# Patient Record
Sex: MALE | Race: BLACK OR AFRICAN AMERICAN | NOT HISPANIC OR LATINO | Employment: STUDENT | URBAN - METROPOLITAN AREA
[De-identification: names, ages, dates, MRNs, and addresses within clinical notes are randomized per-mention and may not be internally consistent; named-entity substitution may affect disease eponyms.]

---
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
## 2021-05-27 ASSESSMENT — PATIENT HEALTH QUESTIONNAIRE - PHQ9: SUM OF ALL RESPONSES TO PHQ QUESTIONS 1-9: 0

## 2021-05-28 ASSESSMENT — ASTHMA QUESTIONNAIRES: ACT_TOTALSCORE: 20

## 2021-05-29 NOTE — TELEPHONE ENCOUNTER
The patient has asthma and has not been seen since 2017.  Refilled his inhaler.  Please schedule him for a physical exam plus asthma check.    No refills until he is seen.    No future appointments.     You can let him know he is due for shots when he comes in, too:  Health Maintenance Due   Topic Date Due     HPV VACCINES (2 - Male 3-dose series) 07/03/2017     HEPATITIS A VACCINES (2 of 2 - 2-dose series) 12/05/2017     ASTHMA ACTION PLAN  04/11/2018     ASTHMA CONTROL TEST  04/11/2018     ASTHMA FOLLOW-UP  04/11/2018     ADVANCE DIRECTIVES DISCUSSED WITH PATIENT  05/08/2019

## 2021-05-29 NOTE — PROGRESS NOTES
"Subjective: This patient comes in for follow-up.  Patient has a history of asthma.  Moderate persistent without complication.  ACT score today was 22.    Patient takes Advair 250/50 1 inhalation twice a day and as needed albuterol.  Also has been on Singulair.    Patient is also had some environmental allergy symptoms.  I can use some fluticasone for the rhinitis.    He otherwise denies new problems or issues.    Patient denies any fever chills denies any bowel or bladder issues.    He gets itchy eyes and runny nose more in the spring and fall but does get year-round at times.    We discussed the use of the fluticasone.    Also discussed rinsing mouth out after he uses his Advair for his asthma.    We did discuss immunizations and he will go ahead and get the HPV and hepatitis A today as well.    Tobacco status: He  reports that he has never smoked. He has never used smokeless tobacco.    Patient Active Problem List    Diagnosis Date Noted     Asthma With Acute Exacerbation      Asthma        Current Outpatient Medications   Medication Sig Dispense Refill     albuterol (PROAIR HFA;PROVENTIL HFA;VENTOLIN HFA) 90 mcg/actuation inhaler Inhale 2 puffs every 6 (six) hours as needed for wheezing or shortness of breath. 1 Inhaler 3     fluticasone propion-salmeterol (ADVAIR DISKUS) 250-50 mcg/dose DISKUS Inhale 1 puff 2 (two) times a day. 60 puff 11     montelukast (SINGULAIR) 10 mg tablet TAKE 1 TABLET DAILY 30 tablet 11     fluticasone propionate (FLONASE) 50 mcg/actuation nasal spray 2 sprays into each nostril daily. 10 g 11     No current facility-administered medications for this visit.        ROS: 10 point review of systems positive as outlined above otherwise negative    Objective:    /68 (Patient Site: Left Arm, Patient Position: Sitting, Cuff Size: Adult Regular)   Pulse 76   Resp 16   Ht 6' 3\" (1.905 m)   Wt 187 lb (84.8 kg)   BMI 23.37 kg/m    Body mass index is 23.37 kg/m .      General appearance no " acute distress    HEENT neck without JVD oropharynx is clear nose with some clear drainage.    Lungs were clear no rales or rhonchi skin was normal no rashes.    No peripheral edema.    Heart was regular rate and 70s.        Assessment:  1. Moderate persistent asthma without complication     2. Asthma  fluticasone propion-salmeterol (ADVAIR DISKUS) 250-50 mcg/dose DISKUS    albuterol (PROAIR HFA;PROVENTIL HFA;VENTOLIN HFA) 90 mcg/actuation inhaler    montelukast (SINGULAIR) 10 mg tablet   3. Immunization counseling  HPV vaccine 9 valent 3 dose IM    Hepatitis A vaccine Ped/Adol 2 dose IM (18yr & under)   4. Environmental and seasonal allergies  fluticasone propionate (FLONASE) 50 mcg/actuation nasal spray     Asthma stable we will treat with Advair and Pro Air and Singulair as before    Environmental and seasonal allergies, will also add fluticasone nasal spray.    Plan: Follow-up in 1 year or earlier as needed.  Immunization update with HPV and hepatitis A today as well  This transcription uses voice recognition software, which may contain typographical errors.

## 2021-05-29 NOTE — TELEPHONE ENCOUNTER
Patient Returning Call  Reason for call:  Dad returning call to the clinic stating I missed a call regarding my son.  Information relayed to patient:  Yes and dad agrees with plan to schedule an appointment for son.   Patient has additional questions:  No  If YES, what are your questions/concerns:  none  Okay to leave a detailed message?: Yes

## 2021-05-29 NOTE — TELEPHONE ENCOUNTER
"Called and left voicemail for pt to call clinic back. #1  \"Okay to relay message and schedule physical\"  "

## 2021-05-29 NOTE — TELEPHONE ENCOUNTER
Relayed message to patients braxton Baez ([facility]). He stated they did  Rx at [facility]. Completing task.

## 2021-05-29 NOTE — TELEPHONE ENCOUNTER
Medication Question or Clarification  Who is calling: Pharmacy: [facility], [place], [place]  What medication are you calling about? (include dose and sig)   fluticasone propion-salmeterol (ADVAIR DISKUS) 250-50 mcg/dose DISKUS 60 puff 11 6/6/2019     Sig - Route: Inhale 1 puff 2 (two) times a day. - Inhalation    Sent to pharmacy as: fluticasone propion-salmeterol (ADVAIR DISKUS) 250-50 mcg/dose DISKUS        Who prescribed the medication?: Gerson Foley MD  What is your question/concern?:  Provider to fax New Prescription of above medication to [facility] [place], [place].  Fax#  Pharmacy: [facility], [place], [place]  Okay to leave a detailed message?: Yes  Site CMT - Please call the pharmacy to obtain any additional needed information.

## 2021-05-29 NOTE — TELEPHONE ENCOUNTER
Patient was in last Thursday and I sent his Advair to [facility] pharmacy.  Please check with the patient as he wanted sent to [facility] as well

## 2021-05-29 NOTE — TELEPHONE ENCOUNTER
RN cannot approve Refill Request    RN can NOT refill this medication overdue for office visits and/or labs.    Remi Hickman, [facility] Triage/Med Refill 5/30/2019    Requested Prescriptions   Pending Prescriptions Disp Refills     fluticasone propion-salmeterol (ADVAIR) 250-50 mcg/dose DISKUS 3 each 3     Sig: Inhale 1 puff 2 (two) times a day.       Asthma Medications Refill Protocol Failed - 5/30/2019  1:15 PM        Failed - PCP or prescribing provider visit in last 6 months     Last office visit with prescriber/PCP: Visit date not found OR same dept: Visit date not found OR same specialty: 6/5/2017 Gerson Foley MD Last physical: Visit date not found Last MTM visit: Visit date not found     Next appt within 3 mo: Visit date not found  Next physical within 3 mo: Visit date not found  Prescriber OR PCP: Gerson Foley MD  Last diagnosis associated with med order: 1. Asthma  - fluticasone propion-salmeterol (ADVAIR) 250-50 mcg/dose DISKUS; Inhale 1 puff 2 (two) times a day.  Dispense: 3 each; Refill: 3    If protocol passes may refill for 6 months if within 3 months of last provider visit (or a total of 9 months).              Failed - PCP or prescribing provider visit in last year     Last office visit with prescriber/PCP: 6/5/2017 Gerson Foley MD OR same dept: Visit date not found OR same specialty: 6/5/2017 Gerson Foley MD  Last physical: Visit date not found Last MTM visit: Visit date not found    Next appt within 3 mo: Visit date not found Next physical within 3 mo: Visit date not found  Prescriber OR PCP: Gerson Foley MD  Last diagnosis associated with med order: 1. Asthma  - fluticasone propion-salmeterol (ADVAIR) 250-50 mcg/dose DISKUS; Inhale 1 puff 2 (two) times a day.  Dispense: 3 each; Refill: 3    If protocol passes may refill for 6 months if within 3 months of last provider visit (or a total of 9 months).

## 2021-05-29 NOTE — TELEPHONE ENCOUNTER
Fax received from [facility], they have started the Prior Authorization Process via Cover My Meds    CoverMyMeds Key: [id number]    Medication Name:   fluticasone propion-salmeterol (ADVAIR) 250-50 mcg/dose DISKUS 3 each 0 5/30/2019     Sig - Route: Inhale 1 puff 2 (two) times a day. - Inhalation    Sent to pharmacy as: fluticasone propion-salmeterol (ADVAIR) 250-50 mcg/dose DISKUS    E-Prescribing Status: Receipt confirmed by pharmacy (5/30/2019  3:53 PM CDT)          Insurance Plan: [employer]  PBM: [facility]  Patient ID: [id number]    Please complete the PA process

## 2021-05-31 VITALS — HEIGHT: 74 IN | BODY MASS INDEX: 23.36 KG/M2 | WEIGHT: 182 LBS

## 2021-06-03 VITALS — HEIGHT: 75 IN | BODY MASS INDEX: 23.25 KG/M2 | WEIGHT: 187 LBS

## 2021-06-10 NOTE — TELEPHONE ENCOUNTER
FYI - Status Update  Who is Calling: Patient's Father  Update: Questioning if refill can be expedited due to being out of medication. Patient's father was transferred to scheduling to schedule an appointment.  Okay to leave a detailed message?:  No return call needed

## 2021-06-10 NOTE — PROGRESS NOTES
"Jonathan Rees is a 20 y.o. male who is being evaluated via a billable video visit.      The patient has been notified of following:     \"This video visit will be conducted via a call between you and your physician/provider. We have found that certain health care needs can be provided without the need for an in-person physical exam.  This service lets us provide the care you need with a video conversation.  If a prescription is necessary we can send it directly to your pharmacy.  If lab work is needed we can place an order for that and you can then stop by our lab to have the test done at a later time.    Video visits are billed at different rates depending on your insurance coverage. Please reach out to your insurance provider with any questions.    If during the course of the call the physician/provider feels a video visit is not appropriate, you will not be charged for this service.\"    Patient has given verbal consent to a Video visit? Yes  How would you like to obtain your AVS? AVS Preference: Mail a copy.  If dropped by the video visit, the video invitation should be sent to: Text to cell phone: [phone]   Will anyone else be joining your video visit? No        Video Start Time: 10:40 am    Additional provider notes:     Subjective: This patient had a virtual visit, video, due to the coronavirus pandemic.    This patient has a history of moderate persistent asthma he takes Advair and as needed albuterol    He is done quite well has not had much need for the albuterol maybe once or twice a week.    In the fall things get a little worse with allergies.  In the past he been on Singulair and fluticasone he has not filled those for a while but would like that.    He is going to college.  He will be staying in apartment with other students.  He needs meningitis B shot.  Also his third HPV shot    We discussed that    Meds were refilled including the Advair albuterol Singulair and fluticasone nasal spray.    ACT " score was 20 today    Denies any COVID-19 symptoms sees not sure if he had any exposures he is actually been checked a couple times.    No additional concerns or issues    We discussed getting a flu shot this fall and he plans to do that    Tobacco status: He  reports that he has never smoked. He has never used smokeless tobacco.    Patient Active Problem List    Diagnosis Date Noted     Asthma With Acute Exacerbation      Asthma        Current Outpatient Medications   Medication Sig Dispense Refill     albuterol (PROAIR HFA;PROVENTIL HFA;VENTOLIN HFA) 90 mcg/actuation inhaler Inhale 2 puffs every 6 (six) hours as needed for wheezing or shortness of breath. 1 Inhaler 3     fluticasone propion-salmeteroL (ADVAIR DISKUS) 250-50 mcg/dose DISKUS Inhale 1 puff 2 (two) times a day. 60 puff 11     fluticasone propionate (FLONASE) 50 mcg/actuation nasal spray 2 sprays into each nostril daily. 10 g 11     montelukast (SINGULAIR) 10 mg tablet TAKE 1 TABLET DAILY 30 tablet 11     No current facility-administered medications for this visit.        ROS:   10 point review of systems positive as discussed above otherwise negative    Objective:    There were no vitals taken for this visit.  There is no height or weight on file to calculate BMI.      General appearance no acute distress    HEENT denies any sore throat he has some mild congestion in the nose    Lungs: Nonlabored breathing presently no wheezing    Heart: No palpitations or rapid heartbeat    Denies any skin changes or problems he does have some mild acne    Lower extremities denies any edema or pain.  No joint redness warmth or swelling.    Results for orders placed or performed in visit on 04/11/17   Rapid Strep A Screen-Throat    Specimen: Throat   Result Value Ref Range    Rapid Strep A Antigen No Group A Strep detected No Group A Strep detected   Influenza A/B Rapid Test    Specimen: Nasopharyngeal Swab   Result Value Ref Range    Influenza  A, Rapid Antigen No  Influenza A antigen detected No Influenza A antigen detected    Influenza B, Rapid Antigen No Influenza B antigen detected No Influenza B antigen detected   Group A Strep, RNA Direct Detection, Throat    Specimen: Throat   Result Value Ref Range    Group A Strep by PCR No Group A Strep rRNA detected No Group A Strep rRNA detected       Assessment:  1. Moderate persistent asthma without complication     2. Asthma  montelukast (SINGULAIR) 10 mg tablet    fluticasone propion-salmeteroL (ADVAIR DISKUS) 250-50 mcg/dose DISKUS    albuterol (PROAIR HFA;PROVENTIL HFA;VENTOLIN HFA) 90 mcg/actuation inhaler   3. Environmental and seasonal allergies  fluticasone propionate (FLONASE) 50 mcg/actuation nasal spray   4. Immunization counseling  HPV vaccine 9 valent 3 dose IM    Meningococcal B (PF)     Moderate persistent asthma stable continue on Advair with albuterol as needed    Singulair and fluticasone refilled for seasonal allergies use as needed.  Also discussed the Singulair could help with the wheezing if things got worse regarding that.    Immunization counseling with meningitis B immunization as well as HPV #3.        Plan: As discussed above    This transcription uses voice recognition software, which may contain typographical errors.      Video-Visit Details    Type of service:  Video Visit    Video End Time (time video stopped): 10:52 AM  Originating Location (pt. Location): Home    Distant Location (provider location):  [facility]     Platform used for Video Visit: Ant Foley MD

## 2021-06-10 NOTE — TELEPHONE ENCOUNTER
Please contact this patient, let him know he is due for follow-up virtual visit with me, video preferred    Let him know he got 1 month supply of medication

## 2021-06-10 NOTE — PROGRESS NOTES
Subjective: This patient comes in for evaluation is a 16-year-old male    Patient has asthma he is on Singulair daily Advair he takes to 2 50-50 most days occasionally uses albuterol    ACT score was 23    He has had some recent symptoms with headaches sore throat cough congestion clear phlegm.    No fever no muscle aches.    I did check strep and flu which came back negative.    I think is a viral infection/URI was given a note to be off of school the 11th and 12th.        Tobacco status: He  reports that he has never smoked. He has never used smokeless tobacco.    Patient Active Problem List    Diagnosis Date Noted     Asthma With Acute Exacerbation      Moderate Persistent Asthma        Current Outpatient Prescriptions   Medication Sig Dispense Refill     albuterol (PROAIR HFA;PROVENTIL HFA;VENTOLIN HFA) 90 mcg/actuation inhaler Inhale 2 puffs every 6 (six) hours as needed for wheezing or shortness of breath. 3 Inhaler 3     fluticasone-salmeterol (ADVAIR) 250-50 mcg/dose DISKUS Inhale 1 puff 2 (two) times a day. 3 each 3     montelukast (SINGULAIR) 10 mg tablet TAKE 1 TABLET DAILY 90 tablet 1     predniSONE (DELTASONE) 20 MG tablet 2 po qd for 5 days   Then 1 po qd for 5 days (Patient taking differently: 2 po qd for 5 days   Then 1 po qd for 5 days PRN) 15 tablet 0     No current facility-administered medications for this visit.        ROS:   10 point review of systems negative other than as outlined above    Objective:    Weight 176 pounds height 6 feet 2 inches temp 98.5 pulse 72 blood pressure 100/60    HEENT canals and TMs normal oropharynx mild erythema neck with some shotty anterior nodes no posterior nodes  Nose with some mild clear drainage  Lungs were clear throughout no rales or rhonchi are good air exchange    Heart was regular S1-S2, no murmur    Abdomen soft nontender    Extremities without swelling or rash.    Strep negative influenza negative    Results for orders placed or performed in visit on  04/11/17   Rapid Strep A Screen-Throat   Result Value Ref Range    Rapid Strep A Antigen No Group A Strep detected No Group A Strep detected   Influenza A/B Rapid Test   Result Value Ref Range    Influenza  A, Rapid Antigen No Influenza A antigen detected No Influenza A antigen detected    Influenza B, Rapid Antigen No Influenza B antigen detected No Influenza B antigen detected   Group A Strep, RNA Direct Detection, Throat   Result Value Ref Range    Group A Strep by PCR No Group A Strep rRNA detected No Group A Strep rRNA detected       Assessment:  1. Bronchitis  Influenza A/B Rapid Test   2. Asthma  montelukast (SINGULAIR) 10 mg tablet    fluticasone-salmeterol (ADVAIR) 250-50 mcg/dose DISKUS    albuterol (PROAIR HFA;PROVENTIL HFA;VENTOLIN HFA) 90 mcg/actuation inhaler   3. Pharyngitis  Rapid Strep A Screen-Throat    Group A Strep, RNA Direct Detection, Throat     Asthma stable continue same meds    Viral URI/bronchitis symptomatic treatment push fluids get rest off of school    Plan: As outlined above    This transcription uses voice recognition software, which may contain typographical errors.

## 2021-06-10 NOTE — TELEPHONE ENCOUNTER
RN cannot approve Refill Request    RN can NOT refill this medication Protocol failed and NO refill given. Last office visit: 6/6/2019 Gerson Foley MD Last Physical: Visit date not found Last MTM visit: Visit date not found Last visit same specialty: 6/6/2019 Gerson Foley MD.  Next visit within 3 mo: Visit date not found  Next physical within 3 mo: Visit date not found      Naomie Cheng, [facility] Connection Triage/Med Refill 8/19/2020    Requested Prescriptions   Pending Prescriptions Disp Refills     fluticasone propion-salmeteroL (ADVAIR DISKUS) 250-50 mcg/dose DISKUS 60 puff 11     Sig: Inhale 1 puff 2 (two) times a day.       Asthma Medications Refill Protocol Failed - 8/19/2020 12:02 PM        Failed - PCP or prescribing provider visit in last year     Last office visit with prescriber/PCP: 6/6/2019 Gerson Foley MD OR same dept: Visit date not found OR same specialty: 6/6/2019 Gerson Foley MD  Last physical: Visit date not found Last MTM visit: Visit date not found    Next appt within 3 mo: Visit date not found Next physical within 3 mo: Visit date not found  Prescriber OR PCP: Gerson Foley MD  Last diagnosis associated with med order: 1. Asthma  - fluticasone propion-salmeteroL (ADVAIR DISKUS) 250-50 mcg/dose DISKUS; Inhale 1 puff 2 (two) times a day.  Dispense: 60 puff; Refill: 11    If protocol passes may refill for 6 months if within 3 months of last provider visit (or a total of 9 months).

## 2021-06-11 NOTE — PROGRESS NOTES
"Subjective: This patient comes in for evaluation this is a 17-year-old male.  Patient wears soft contacts.  He has not been able to wear them for the last 2-3 weeks.  He has had some bilateral eye irritation.    Has not had a lot of drainage she does have a history of asthma and allergies and does probably have some allergic conjunctivitis please see below.    He needed update on shots hepatitis A HPV and Menactra were given.    No photophobia.  He has not worn them for 2-3 weeks now he actually had some irritation over the past 2-3 months and it was trying to wear him    Tobacco status: He  reports that he has never smoked. He has never used smokeless tobacco.    Patient Active Problem List    Diagnosis Date Noted     Asthma With Acute Exacerbation      Moderate Persistent Asthma        Current Outpatient Prescriptions   Medication Sig Dispense Refill     albuterol (PROAIR HFA;PROVENTIL HFA;VENTOLIN HFA) 90 mcg/actuation inhaler Inhale 2 puffs every 6 (six) hours as needed for wheezing or shortness of breath. 3 Inhaler 3     fluticasone-salmeterol (ADVAIR) 250-50 mcg/dose DISKUS Inhale 1 puff 2 (two) times a day. 3 each 3     montelukast (SINGULAIR) 10 mg tablet TAKE 1 TABLET DAILY 90 tablet 1     tobramycin-dexamethasone (TOBRADEX) ophthalmic solution 1 gtt to each eye bid for 7 days 5 mL 0     No current facility-administered medications for this visit.        ROS:   Review of systems negative other than as outlined above    Objective:    /58 (Patient Site: Left Arm, Patient Position: Sitting, Cuff Size: Adult Large)  Pulse 76  Temp 98.5  F (36.9  C) (Oral)   Resp 12  Ht 6' 2.25\" (1.886 m)  Wt 182 lb (82.6 kg)  BMI 23.21 kg/m2  Body mass index is 23.21 kg/(m^2).      General appearance no acute distress    HEENT with minimal conjunctival injection no photophobia no foreign bodies noted no uptake on fluorescein exam on the cornea    Extraocular movements full    No periorbital swelling.  Nose " clear    Neck negative no adenopathy    Skin was normal.        Assessment:  1. Conjunctivitis  tobramycin-dexamethasone (TOBRADEX) ophthalmic solution   2. Need for vaccination  Hepatitis A vaccine pediatric / adolescent 2 dose IM    Meningococcal MCV4P    HPV vaccine 9 valent 3 dose IM     Conjunctivitis/related to contacts or allergies.    Stay off the soft contacts for another 10 days.    For the next 7 days use TobraDex eyedrops 1 drop to each eye twice daily.    Then be off of drops for couple days then start a new pair of contacts.  He should not go 2-3 months as he has been doing before changing out the contacts either.    Plan: As outlined above if not improved see ophthalmology    This transcription uses voice recognition software, which may contain typographical errors.

## 2021-06-12 NOTE — TELEPHONE ENCOUNTER
Last visit: 08/20/2020    , Medication refill requested. Please authorize medication if appropriate. Thank you.

## 2021-06-16 NOTE — TELEPHONE ENCOUNTER
Telephone Encounter by Keila Au at 6/3/2019  3:14 PM     Author: Keila Au Service: -- Author Type: --    Filed: 6/3/2019  3:27 PM Encounter Date: 6/2/2019 Status: Signed    : Keila Au         [facility] # [id number] - [phone]    Needs to be written for DAW1 in order for med to go through    Brand preferred by INS  Send in or call and let the pharmacy know the daria change

## 2021-08-23 ENCOUNTER — HOSPITAL ENCOUNTER (OUTPATIENT)
Facility: CLINIC | Age: 21
Setting detail: OBSERVATION
Discharge: HOME | End: 2021-08-24
Attending: EMERGENCY MEDICINE | Admitting: INTERNAL MEDICINE
Payer: OTHER GOVERNMENT

## 2021-08-23 DIAGNOSIS — J45.909 ASTHMA: ICD-10-CM

## 2021-08-23 DIAGNOSIS — J45.41 MODERATE PERSISTENT ASTHMA WITH ACUTE EXACERBATION: Primary | ICD-10-CM

## 2021-08-23 DIAGNOSIS — Z11.52 ENCOUNTER FOR SCREENING LABORATORY TESTING FOR COVID-19 VIRUS: ICD-10-CM

## 2021-08-23 DIAGNOSIS — J45.41 MODERATE PERSISTENT ASTHMA WITH EXACERBATION: ICD-10-CM

## 2021-08-23 PROCEDURE — C9803 HOPD COVID-19 SPEC COLLECT: HCPCS | Performed by: EMERGENCY MEDICINE

## 2021-08-23 PROCEDURE — 99285 EMERGENCY DEPT VISIT HI MDM: CPT | Mod: 25 | Performed by: EMERGENCY MEDICINE

## 2021-08-23 PROCEDURE — 96367 TX/PROPH/DG ADDL SEQ IV INF: CPT | Performed by: EMERGENCY MEDICINE

## 2021-08-23 PROCEDURE — 96365 THER/PROPH/DIAG IV INF INIT: CPT | Performed by: EMERGENCY MEDICINE

## 2021-08-23 PROCEDURE — 96375 TX/PRO/DX INJ NEW DRUG ADDON: CPT | Performed by: EMERGENCY MEDICINE

## 2021-08-23 PROCEDURE — 96366 THER/PROPH/DIAG IV INF ADDON: CPT | Performed by: EMERGENCY MEDICINE

## 2021-08-23 PROCEDURE — 250N000009 HC RX 250

## 2021-08-23 PROCEDURE — 99285 EMERGENCY DEPT VISIT HI MDM: CPT | Performed by: EMERGENCY MEDICINE

## 2021-08-23 PROCEDURE — 94640 AIRWAY INHALATION TREATMENT: CPT | Performed by: EMERGENCY MEDICINE

## 2021-08-23 RX ORDER — IPRATROPIUM BROMIDE AND ALBUTEROL SULFATE 2.5; .5 MG/3ML; MG/3ML
SOLUTION RESPIRATORY (INHALATION)
Status: COMPLETED
Start: 2021-08-23 | End: 2021-08-23

## 2021-08-23 RX ADMIN — IPRATROPIUM BROMIDE AND ALBUTEROL SULFATE 3 ML: .5; 3 SOLUTION RESPIRATORY (INHALATION) at 23:38

## 2021-08-24 ENCOUNTER — APPOINTMENT (OUTPATIENT)
Dept: GENERAL RADIOLOGY | Facility: CLINIC | Age: 21
End: 2021-08-24
Attending: EMERGENCY MEDICINE
Payer: OTHER GOVERNMENT

## 2021-08-24 VITALS
HEIGHT: 75 IN | SYSTOLIC BLOOD PRESSURE: 146 MMHG | TEMPERATURE: 97.1 F | BODY MASS INDEX: 23 KG/M2 | HEART RATE: 95 BPM | WEIGHT: 185 LBS | DIASTOLIC BLOOD PRESSURE: 66 MMHG | OXYGEN SATURATION: 98 % | RESPIRATION RATE: 16 BRPM

## 2021-08-24 PROBLEM — J45.41 MODERATE PERSISTENT ASTHMA WITH EXACERBATION: Status: ACTIVE | Noted: 2021-08-24

## 2021-08-24 LAB
ANION GAP SERPL CALCULATED.3IONS-SCNC: 5 MMOL/L (ref 3–14)
BASOPHILS # BLD AUTO: 0 10E3/UL (ref 0–0.2)
BASOPHILS NFR BLD AUTO: 0 %
BUN SERPL-MCNC: 12 MG/DL (ref 7–30)
CALCIUM SERPL-MCNC: 8.6 MG/DL (ref 8.5–10.1)
CHLORIDE BLD-SCNC: 105 MMOL/L (ref 94–109)
CO2 SERPL-SCNC: 27 MMOL/L (ref 20–32)
CREAT SERPL-MCNC: 1.09 MG/DL (ref 0.66–1.25)
EOSINOPHIL # BLD AUTO: 0.3 10E3/UL (ref 0–0.7)
EOSINOPHIL NFR BLD AUTO: 3 %
ERYTHROCYTE [DISTWIDTH] IN BLOOD BY AUTOMATED COUNT: 13.2 % (ref 10–15)
GFR SERPL CREATININE-BSD FRML MDRD: >90 ML/MIN/1.73M2
GLUCOSE BLD-MCNC: 106 MG/DL (ref 70–99)
HCT VFR BLD AUTO: 41.3 % (ref 40–53)
HGB BLD-MCNC: 14.1 G/DL (ref 13.3–17.7)
IMM GRANULOCYTES # BLD: 0 10E3/UL
IMM GRANULOCYTES NFR BLD: 0 %
LYMPHOCYTES # BLD AUTO: 0.5 10E3/UL (ref 0.8–5.3)
LYMPHOCYTES NFR BLD AUTO: 6 %
MCH RBC QN AUTO: 30.5 PG (ref 26.5–33)
MCHC RBC AUTO-ENTMCNC: 34.1 G/DL (ref 31.5–36.5)
MCV RBC AUTO: 89 FL (ref 78–100)
MONOCYTES # BLD AUTO: 0.5 10E3/UL (ref 0–1.3)
MONOCYTES NFR BLD AUTO: 6 %
NEUTROPHILS # BLD AUTO: 7 10E3/UL (ref 1.6–8.3)
NEUTROPHILS NFR BLD AUTO: 85 %
NRBC # BLD AUTO: 0 10E3/UL
NRBC BLD AUTO-RTO: 0 /100
PLATELET # BLD AUTO: 161 10E3/UL (ref 150–450)
POTASSIUM BLD-SCNC: 3.8 MMOL/L (ref 3.4–5.3)
PROCALCITONIN SERPL-MCNC: 0.07 NG/ML
RBC # BLD AUTO: 4.62 10E6/UL (ref 4.4–5.9)
SARS-COV-2 RNA RESP QL NAA+PROBE: NEGATIVE
SODIUM SERPL-SCNC: 137 MMOL/L (ref 133–144)
WBC # BLD AUTO: 8.3 10E3/UL (ref 4–11)

## 2021-08-24 PROCEDURE — U0003 INFECTIOUS AGENT DETECTION BY NUCLEIC ACID (DNA OR RNA); SEVERE ACUTE RESPIRATORY SYNDROME CORONAVIRUS 2 (SARS-COV-2) (CORONAVIRUS DISEASE [COVID-19]), AMPLIFIED PROBE TECHNIQUE, MAKING USE OF HIGH THROUGHPUT TECHNOLOGIES AS DESCRIBED BY CMS-2020-01-R: HCPCS | Performed by: EMERGENCY MEDICINE

## 2021-08-24 PROCEDURE — 99236 HOSP IP/OBS SAME DATE HI 85: CPT | Performed by: STUDENT IN AN ORGANIZED HEALTH CARE EDUCATION/TRAINING PROGRAM

## 2021-08-24 PROCEDURE — 94640 AIRWAY INHALATION TREATMENT: CPT | Mod: 76

## 2021-08-24 PROCEDURE — 250N000009 HC RX 250: Performed by: EMERGENCY MEDICINE

## 2021-08-24 PROCEDURE — 99207 PR APP CREDIT; MD BILLING SHARED VISIT: CPT | Performed by: INTERNAL MEDICINE

## 2021-08-24 PROCEDURE — 80048 BASIC METABOLIC PNL TOTAL CA: CPT | Performed by: EMERGENCY MEDICINE

## 2021-08-24 PROCEDURE — 250N000011 HC RX IP 250 OP 636: Performed by: EMERGENCY MEDICINE

## 2021-08-24 PROCEDURE — 84145 PROCALCITONIN (PCT): CPT | Performed by: STUDENT IN AN ORGANIZED HEALTH CARE EDUCATION/TRAINING PROGRAM

## 2021-08-24 PROCEDURE — 71045 X-RAY EXAM CHEST 1 VIEW: CPT

## 2021-08-24 PROCEDURE — 258N000003 HC RX IP 258 OP 636: Performed by: EMERGENCY MEDICINE

## 2021-08-24 PROCEDURE — G0378 HOSPITAL OBSERVATION PER HR: HCPCS

## 2021-08-24 PROCEDURE — 85025 COMPLETE CBC W/AUTO DIFF WBC: CPT | Performed by: EMERGENCY MEDICINE

## 2021-08-24 PROCEDURE — 36415 COLL VENOUS BLD VENIPUNCTURE: CPT | Performed by: EMERGENCY MEDICINE

## 2021-08-24 PROCEDURE — 999N000157 HC STATISTIC RCP TIME EA 10 MIN

## 2021-08-24 PROCEDURE — 94640 AIRWAY INHALATION TREATMENT: CPT

## 2021-08-24 PROCEDURE — 250N000012 HC RX MED GY IP 250 OP 636 PS 637: Performed by: EMERGENCY MEDICINE

## 2021-08-24 RX ORDER — ONDANSETRON 2 MG/ML
4 INJECTION INTRAMUSCULAR; INTRAVENOUS EVERY 6 HOURS PRN
Status: CANCELLED | OUTPATIENT
Start: 2021-08-24

## 2021-08-24 RX ORDER — IPRATROPIUM BROMIDE AND ALBUTEROL SULFATE 2.5; .5 MG/3ML; MG/3ML
3 SOLUTION RESPIRATORY (INHALATION) EVERY 4 HOURS
Status: DISCONTINUED | OUTPATIENT
Start: 2021-08-24 | End: 2021-08-24 | Stop reason: HOSPADM

## 2021-08-24 RX ORDER — CEFTRIAXONE 2 G/1
2 INJECTION, POWDER, FOR SOLUTION INTRAMUSCULAR; INTRAVENOUS ONCE
Status: COMPLETED | OUTPATIENT
Start: 2021-08-24 | End: 2021-08-24

## 2021-08-24 RX ORDER — FLUTICASONE PROPIONATE 50 MCG
1 SPRAY, SUSPENSION (ML) NASAL DAILY
Status: CANCELLED | OUTPATIENT
Start: 2021-08-24

## 2021-08-24 RX ORDER — ONDANSETRON 2 MG/ML
4 INJECTION INTRAMUSCULAR; INTRAVENOUS EVERY 6 HOURS PRN
Status: DISCONTINUED | OUTPATIENT
Start: 2021-08-24 | End: 2021-08-24 | Stop reason: HOSPADM

## 2021-08-24 RX ORDER — LANOLIN ALCOHOL/MO/W.PET/CERES
3 CREAM (GRAM) TOPICAL
Status: CANCELLED | OUTPATIENT
Start: 2021-08-24

## 2021-08-24 RX ORDER — IPRATROPIUM BROMIDE AND ALBUTEROL SULFATE 2.5; .5 MG/3ML; MG/3ML
3 SOLUTION RESPIRATORY (INHALATION)
Status: DISPENSED | OUTPATIENT
Start: 2021-08-24 | End: 2021-08-24

## 2021-08-24 RX ORDER — PREDNISONE 20 MG/1
40 TABLET ORAL DAILY
Qty: 8 TABLET | Refills: 0 | Status: SHIPPED | OUTPATIENT
Start: 2021-08-25 | End: 2021-08-28

## 2021-08-24 RX ORDER — FLUTICASONE PROPIONATE 50 MCG
1 SPRAY, SUSPENSION (ML) NASAL DAILY
Qty: 16 G | Refills: 0 | Status: SHIPPED | OUTPATIENT
Start: 2021-08-24 | End: 2022-07-28

## 2021-08-24 RX ORDER — ONDANSETRON 4 MG/1
4 TABLET, ORALLY DISINTEGRATING ORAL ONCE
Status: DISCONTINUED | OUTPATIENT
Start: 2021-08-24 | End: 2021-08-24 | Stop reason: HOSPADM

## 2021-08-24 RX ORDER — AMOXICILLIN 250 MG
1 CAPSULE ORAL 2 TIMES DAILY PRN
Status: CANCELLED | OUTPATIENT
Start: 2021-08-24

## 2021-08-24 RX ORDER — AZITHROMYCIN 500 MG/5ML
500 INJECTION, POWDER, LYOPHILIZED, FOR SOLUTION INTRAVENOUS ONCE
Status: COMPLETED | OUTPATIENT
Start: 2021-08-24 | End: 2021-08-24

## 2021-08-24 RX ORDER — ONDANSETRON 4 MG/1
4 TABLET, ORALLY DISINTEGRATING ORAL EVERY 6 HOURS PRN
Status: CANCELLED | OUTPATIENT
Start: 2021-08-24

## 2021-08-24 RX ORDER — IPRATROPIUM BROMIDE AND ALBUTEROL SULFATE 2.5; .5 MG/3ML; MG/3ML
3 SOLUTION RESPIRATORY (INHALATION) EVERY 6 HOURS PRN
Qty: 80 ML | Refills: 0 | Status: SHIPPED | OUTPATIENT
Start: 2021-08-24 | End: 2022-07-28

## 2021-08-24 RX ORDER — AMOXICILLIN 250 MG
2 CAPSULE ORAL 2 TIMES DAILY PRN
Status: CANCELLED | OUTPATIENT
Start: 2021-08-24

## 2021-08-24 RX ORDER — PREDNISONE 20 MG/1
60 TABLET ORAL ONCE
Status: COMPLETED | OUTPATIENT
Start: 2021-08-24 | End: 2021-08-24

## 2021-08-24 RX ORDER — PREDNISONE 20 MG/1
40 TABLET ORAL DAILY
Status: DISCONTINUED | OUTPATIENT
Start: 2021-08-24 | End: 2021-08-24 | Stop reason: HOSPADM

## 2021-08-24 RX ORDER — SODIUM CHLORIDE 9 MG/ML
INJECTION, SOLUTION INTRAVENOUS CONTINUOUS
Status: DISCONTINUED | OUTPATIENT
Start: 2021-08-24 | End: 2021-08-24

## 2021-08-24 RX ADMIN — ONDANSETRON 4 MG: 2 INJECTION INTRAMUSCULAR; INTRAVENOUS at 01:42

## 2021-08-24 RX ADMIN — IPRATROPIUM BROMIDE AND ALBUTEROL SULFATE 3 ML: .5; 3 SOLUTION RESPIRATORY (INHALATION) at 16:04

## 2021-08-24 RX ADMIN — PREDNISONE 40 MG: 20 TABLET ORAL at 08:27

## 2021-08-24 RX ADMIN — CEFTRIAXONE SODIUM 2 G: 2 INJECTION, POWDER, FOR SOLUTION INTRAMUSCULAR; INTRAVENOUS at 01:39

## 2021-08-24 RX ADMIN — IPRATROPIUM BROMIDE AND ALBUTEROL SULFATE 3 ML: .5; 3 SOLUTION RESPIRATORY (INHALATION) at 01:50

## 2021-08-24 RX ADMIN — IPRATROPIUM BROMIDE AND ALBUTEROL SULFATE 3 ML: .5; 3 SOLUTION RESPIRATORY (INHALATION) at 08:56

## 2021-08-24 RX ADMIN — SODIUM CHLORIDE 1000 ML: 9 INJECTION, SOLUTION INTRAVENOUS at 01:39

## 2021-08-24 RX ADMIN — IPRATROPIUM BROMIDE AND ALBUTEROL SULFATE 3 ML: .5; 3 SOLUTION RESPIRATORY (INHALATION) at 00:31

## 2021-08-24 RX ADMIN — AZITHROMYCIN MONOHYDRATE 500 MG: 500 INJECTION, POWDER, LYOPHILIZED, FOR SOLUTION INTRAVENOUS at 02:18

## 2021-08-24 RX ADMIN — IPRATROPIUM BROMIDE AND ALBUTEROL SULFATE 3 ML: .5; 3 SOLUTION RESPIRATORY (INHALATION) at 12:48

## 2021-08-24 RX ADMIN — SODIUM CHLORIDE: 9 INJECTION, SOLUTION INTRAVENOUS at 02:19

## 2021-08-24 RX ADMIN — PREDNISONE 60 MG: 20 TABLET ORAL at 00:30

## 2021-08-24 ASSESSMENT — MIFFLIN-ST. JEOR: SCORE: 1934.78

## 2021-08-24 ASSESSMENT — ENCOUNTER SYMPTOMS
FEVER: 0
COUGH: 0
SHORTNESS OF BREATH: 1

## 2021-08-24 NOTE — ED PROVIDER NOTES
"    [facility] EMERGENCY DEPARTMENT ([facility])   August 23, 2021  History     Chief Complaint   Patient presents with     Asthma Exacerbation     Onset 48 hours ago with \"asthma problems,\" wheezing, no relief with inhaler     HPI  Jonathan Rees is a 21 year old male who has a history of asthma and states he usually uses Advair inhaler to help his asthma playing basketball, etc.  Patient states he has not been on prednisone for a long time and does not have a neb machine at home.  Patient states that over the past week he has noted some increased shortness of breath, no fevers, no productive coughs, and denies any known Covid exposures.  Patient presents here to the ER for evaluation for worsening shortness of breath.    This part of the medical record was transcribed by Rolf Hooks, Medical Scribe, from a dictation done by Mele Eugene MD.     PAST MEDICAL HISTORY:   Past Medical History:   Diagnosis Date     Uncomplicated asthma        PAST SURGICAL HISTORY: History reviewed. No pertinent surgical history.    Past medical history, past surgical history, medications, and allergies were reviewed with the patient. Additional pertinent items: None    FAMILY HISTORY:   Family History   Problem Relation Age of Onset     Hypertension Other         grandomother     LUNG DISEASE Mother      LUNG DISEASE Father      LUNG DISEASE Other         grandmother     No Known Problems Sister      No Known Problems Brother      No Known Problems Brother        SOCIAL HISTORY:   Social History     Tobacco Use     Smoking status: Never Smoker     Smokeless tobacco: Never Used     Tobacco comment: no tob exp   Substance Use Topics     Alcohol use: No     Social history was reviewed with the patient. Additional pertinent items: None      Patient's Medications    ADVAIR DISKUS 250-50 MCG/DOSE DISKUS    [ADVAIR DISKUS 250-50 MCG/DOSE DISKUS] USE 1 INHALATION TWICE A DAY (1 MONTH SUPPLY ONLY, DUE FOR A FOLLOW UP VIRTUAL VISIT " WITH DOCTOR HU)    ALBUTEROL (PROAIR HFA;PROVENTIL HFA;VENTOLIN HFA) 90 MCG/ACTUATION INHALER    [ALBUTEROL (PROAIR HFA;PROVENTIL HFA;VENTOLIN HFA) 90 MCG/ACTUATION INHALER] Inhale 2 puffs every 6 (six) hours as needed for wheezing or shortness of breath.    FLUTICASONE PROPIONATE (FLONASE) 50 MCG/ACTUATION NASAL SPRAY    [FLUTICASONE PROPIONATE (FLONASE) 50 MCG/ACTUATION NASAL SPRAY] 2 sprays into each nostril daily.    MONTELUKAST (SINGULAIR) 10 MG TABLET    [MONTELUKAST (SINGULAIR) 10 MG TABLET] TAKE 1 TABLET DAILY        No Known Allergies     Review of Systems   Constitutional: Negative for fever.   Respiratory: Positive for shortness of breath. Negative for cough.    All other systems reviewed and are negative.    A complete review of systems was performed with pertinent positives and negatives noted in the HPI, and all other systems negative.    Physical Exam   BP: 137/73  Pulse: 104  Temp: 99.5  F (37.5  C)  Resp: 22  SpO2: 95 %      Physical Exam  Vitals and nursing note reviewed.   Constitutional:       Comments: Able to speak in full sentences   HENT:      Head: Atraumatic.   Eyes:      Extraocular Movements: Extraocular movements intact.      Pupils: Pupils are equal, round, and reactive to light.   Cardiovascular:      Rate and Rhythm: Regular rhythm.   Pulmonary:      Breath sounds: Wheezing present.      Comments: Wheezes throughout  Musculoskeletal:         General: No tenderness.      Cervical back: Neck supple.   Skin:     General: Skin is warm.   Neurological:      General: No focal deficit present.      Mental Status: He is alert and oriented to person, place, and time.   Psychiatric:         Mood and Affect: Mood normal.             ED Course        Procedures            Medications   ipratropium - albuterol 0.5 mg/2.5 mg/3 mL (DUONEB) neb solution 3 mL (3 mLs Nebulization Given 8/24/21 0031)   sodium chloride (PF) 0.9% PF flush 3 mL (has no administration in time range)   sodium chloride  (PF) 0.9% PF flush 3 mL (has no administration in time range)   0.9% sodium chloride BOLUS (has no administration in time range)   sodium chloride 0.9% infusion (has no administration in time range)   ondansetron (ZOFRAN-ODT) ODT tab 4 mg (has no administration in time range)   cefTRIAXone (ROCEPHIN) 2 g vial to attach to  ml bag for ADULTS or NS 50 ml bag for PEDS (has no administration in time range)   azithromycin 500 mg (ZITHROMAX) in 0.9% NaCl 250 mL intermittent infusion 500 mg (has no administration in time range)   predniSONE (DELTASONE) tablet 40 mg (has no administration in time range)   ondansetron (ZOFRAN) injection 4 mg (has no administration in time range)   ipratropium - albuterol 0.5 mg/2.5 mg/3 mL (DUONEB) neb solution 3 mL (has no administration in time range)   ipratropium - albuterol 0.5 mg/2.5 mg/3 mL (DUONEB) 0.5-2.5 (3) MG/3ML neb solution (3 mLs  Given 8/23/21 2338)   predniSONE (DELTASONE) tablet 60 mg (60 mg Oral Given 8/24/21 0030)        Orders Placed This Encounter   Procedures     XR Chest Port 1 View     Symptomatic COVID-19 Virus (Coronavirus) by PCR     CBC with platelets differential     Basic metabolic panel     Peripheral IV catheter     Admit to Inpatient         Assessments & Plan (with Medical Decision Making)     I have reviewed the nursing notes.    Patient received 3 duo nebs in the ER and had some improvement but still had scattered wheezes throughout with decreased aeration bilaterally.  Test x-ray revealed some slight fluffiness which will be treated with antibiotics and meanwhile the patient will be admitted to the hospitalist service on the [facility] [place] [place] for further treatment    I have reviewed the findings, diagnosis, plan and need for follow up with the patient.        Final diagnoses:   Moderate persistent asthma with exacerbation     Mele Eugene MD, MD      I, Rolf Hooks, am serving as a trained medical scribe to document services personally  performed by Mele Eugene Md, MD, based on the provider's statements to me.     I, Mele Eugene Md, MD, was physically present and have reviewed and verified the accuracy of this note documented by Rolf Hooks.    Mele Eugene MD    8/23/2021   [facility] EMERGENCY DEPARTMENT     Mele Eugene MD  08/24/21 0105

## 2021-08-24 NOTE — DISCHARGE SUMMARY
[facility]  Hospitalist Discharge Summary      Date of Admission:  8/23/2021  Date of Discharge:  8/24/2021  Discharging Provider: Oswaldo Stephens MD      Discharge Diagnoses   Acute exacerbation of Asthma   Moderate persistent asthma     Follow-ups Needed After Discharge   Follow-up Appointments     Adult [facility] Follow-up and recommended labs and tests      Follow up with primary care provider, Gerson Foley, within 7 days for   hospital follow- up.  No follow up labs or test are needed.      Appointments on [place] and/or [facility] (with [facility] or [facility]   provider or service). Call [phone] if you haven't heard regarding   these appointments within 7 days of discharge.                 Discharge Disposition   Discharged to home  Condition at discharge: Stable      Hospital Course   Jonathan Rees is a 21 year old man with moderate persistent asthma admitted on 8/23/2021 for asthma exacerbation in setting of missed maintenance therapy.      Acute Asthma Exacerbation  Post-tussive emesis  Likely secondary to inadequate maintenance therapy for moderate persistent asthma. No signs or symptoms suggesting bacterial pneumonia. Chest radiograph without focal opacities.  Received IV abx for empiric CAP treatment - no further antibiotics at this time. Previously on montelukast which didn't help - no longer taking it. Ongoing expiratory wheeze and crackles, but improving overall.   - Prednisone 40mg x5 days  - Duonebs Q4h changed to PRN. Patient will be sent home with nebulizer machine and receive teaching from RT about use of nebulizer   - Ensure patient has adequate access to medications, especially advair, upon discharge ( prescription for Advair given)  - Hold Advair while on nebs  - PTA flonase  - PRN Zofran  - No further antibiotics  - Follow up with PCP, Dr. Simmons in 1 week     Consultations This Hospital Stay   RESPIRATORY CARE IP CONSULT    Code  Status   No Order    Time Spent on this Encounter   I, Oswaldo Stephens MD, personally saw the patient today and spent greater than 30 minutes discharging this patient.       Oswaldo Stephens MD  [facility] MED SURG ORTHOPEDIC  [address] [address]  Phone: [phone]  Fax: [phone]  ______________________________________________________________________    Physical Exam   Vital Signs: Temp: 98.9  F (37.2  C) Temp src: Oral BP: (!) 144/79 Pulse: 92   Resp: 18 SpO2: 92 % O2 Device: None (Room air)    Weight: 0 lbs 0 oz  General Appearance: Awake, alert and not in distress   Respiratory:  Decreased air entry bilaterally. Faint wheeze   Cardiovascular: Normal heart sounds. No murmurs   GI: Soft, non tender. Normal bowel sounds   Skin: No bruising or bleeding   Other: Awake, alert and orientated X 3        Primary Care Physician   Gerson Foley    Discharge Orders      Reason for your hospital stay    Acute exacerbation of Asthma     Activity    Your activity upon discharge: activity as tolerated     Adult [facility] Follow-up and recommended labs and tests    Follow up with primary care provider, Gerson Foley, within 7 days for hospital follow- up.  No follow up labs or test are needed.      Appointments on [place] and/or [facility] (with [facility] or [facility] provider or service). Call [phone] if you haven't heard regarding these appointments within 7 days of discharge.     Diet    Follow this diet upon discharge: Orders Placed This Encounter      Regular Diet Adult       Significant Results and Procedures   Results for orders placed or performed during the hospital encounter of 08/23/21   XR Chest Port 1 View    Narrative    EXAM: CHEST SINGLE VIEW PORTABLE  LOCATION: [facility]  DATE/TIME: 8/24/2021 12:15 AM    INDICATION: Asthma.    COMPARISON: None.    FINDINGS: The lungs are clear. Normal size  cardiac silhouette.      Impression    IMPRESSION: No evidence of active cardiopulmonary disease.        Discharge Medications   Current Discharge Medication List      START taking these medications    Details   ipratropium - albuterol 0.5 mg/2.5 mg/3 mL (DUONEB) 0.5-2.5 (3) MG/3ML neb solution Take 1 vial (3 mLs) by nebulization every 6 hours as needed for shortness of breath / dyspnea or wheezing  Qty: 80 mL, Refills: 0    Associated Diagnoses: Moderate persistent asthma with acute exacerbation      predniSONE (DELTASONE) 20 MG tablet Take 2 tablets (40 mg) by mouth daily for 3 days  Qty: 8 tablet, Refills: 0    Associated Diagnoses: Moderate persistent asthma with acute exacerbation         CONTINUE these medications which have CHANGED    Details   fluticasone-salmeterol (ADVAIR DISKUS) 250-50 MCG/DOSE inhaler Inhale 1 puff into the lungs 2 times daily  Qty: 60 each, Refills: 3    Associated Diagnoses: Moderate persistent asthma with acute exacerbation         CONTINUE these medications which have NOT CHANGED    Details   albuterol (PROAIR HFA;PROVENTIL HFA;VENTOLIN HFA) 90 mcg/actuation inhaler [ALBUTEROL (PROAIR HFA;PROVENTIL HFA;VENTOLIN HFA) 90 MCG/ACTUATION INHALER] Inhale 2 puffs every 6 (six) hours as needed for wheezing or shortness of breath.  Qty: 1 Inhaler, Refills: 3    Comments: May substitute the equivalent medication per insurance preference.  Associated Diagnoses: Asthma      fluticasone propionate (FLONASE) 50 mcg/actuation nasal spray [FLUTICASONE PROPIONATE (FLONASE) 50 MCG/ACTUATION NASAL SPRAY] 2 sprays into each nostril daily.  Qty: 10 g, Refills: 11    Associated Diagnoses: Environmental and seasonal allergies           Allergies   No Known Allergies

## 2021-08-24 NOTE — PLAN OF CARE
"  VS: BP (!) 144/79   Pulse 92   Temp 98.9  F (37.2  C) (Oral)   Resp 18   Ht 1.905 m (6' 3\")   Wt 83.9 kg (185 lb)   SpO2 98%   BMI 23.12 kg/m       O2: >98   Output: Voiding spontaneously without difficulty in the bathroom.   Last BM: 8/23/21   Activity: Up independent   Up for meals? Yes   Skin: Intact    Pain: Pt. Denies pain   CMS: A&O X 4   Dressing: None   Diet: Regular   LDA: None   Equipment: Call light in reach    Plan: TBD   Additional Info:      "

## 2021-08-24 NOTE — H&P
[facility]    History and Physical - Hospitalist Service       Date of Admission:  8/23/2021    Assessment & Plan      Jonathan Rees is a 21 year old man with moderate persistent asthma admitted on 8/23/2021 for asthma exacerbation in setting of missed maintenance therapy.     Acute Asthma Exacerbation  Post-tussive emesis  Likely secondary to inadequate maintenance therapy for moderate persistent asthma. No signs or symptoms suggesting bacterial pneumonia. Chest radiograph without focal opacities.  Received IV abx for empiric CAP treatment - no further antibiotics at this time. Previously on montelukast which didn't help - no longer taking it. Ongoing expiratory wheeze and crackles, but improving overall.   - Prednisone 40mg x5 days  - Duonebs Q4h  - Ensure patient has adequate access to medications, especially advair, upon discharge  - Hold Advair while on nebs  - PTA flonase  - PRN Zofran  - No further antibiotics     Diet: Regular Diet Adult    DVT Prophylaxis: Low Risk/Ambulatory with no VTE prophylaxis indicated  Gonzales Catheter: Not present  Central Lines: None  Code Status:   Full      Disposition Plan   Expected discharge: 1-2 days recommended to prior living arrangement once O2 use less than 0 liters/minute.     The patient's care was discussed with the Patient.    Leonela Foss MD  [facility]  Securely message with the [employer] Web Console (learn more here)  Text page via [employer] Paging/Directory      ______________________________________________________________________    Chief Complaint   Dyspnea    History is obtained from the patient    History of Present Illness   Jonathan Rees is a 21 year old young man with history of moderate persistent asthma presenting with one week of progressive shortness of breath with 3 days of cough. Historically, has been hospitalized ~5 times for asthma, mostly at [facility]. Typically uses Advair twice daily or two puffs in the morning, Flonase daily with albuterol as needed. Previously was on montelukast, but stopped it ~2 years ago when didn't seem to help.     Ran out of Advair inhaler one month ago. 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 Intermittently would use his father's Advair inhaler. Day prior to admission, picked up the Advair inhaler, but felt like breathing worsened over the next 48 hours, so came to ED for evaluation. No fevers or chills. Has had vomiting - three times while driving to the hospital. States nausea and vomiting are typical of his asthma exacerbations and that they're usually a sign that he's improving. No abdominal pain, diarrhea, hematochezia. No chest pain, palpitations, lower extremity swelling.     In the ED, chest radiograph showed no focal opacity. Received 3 nebs with ongoing wheezing and poor air movement. Due to concern for CAP, patient also received IV Ceftriaxone and azithromycin then was admitted for observation.     Review of Systems    The 10 point Review of Systems is negative other than noted in the HPI or here.     Past Medical History    I have reviewed this patient's medical history and updated it with pertinent information if needed.   Past Medical History:   Diagnosis Date     Uncomplicated asthma        Past Surgical History   I have reviewed this patient's surgical history and updated it with pertinent information if needed.  History reviewed. No pertinent surgical history.    Social History   I have reviewed this patient's social history and updated with pertinent information.  Social History     Tobacco Use     Smoking status: Never Smoker     Smokeless tobacco: Never Used     Tobacco comment: no tob exp   Substance Use Topics     Alcohol use: No     Drug use: No       Family History   I have reviewed this patient's family history and updated it with  pertinent information if needed.  Family History   Problem Relation Age of Onset     LUNG DISEASE Mother      LUNG DISEASE Father      Asthma Father      Asthma Sister      Asthma Brother      Asthma Brother      LUNG DISEASE Other         grandmother       Prior to Admission Medications   Prior to Admission Medications   Prescriptions Last Dose Informant Patient Reported? Taking?   ADVAIR DISKUS 250-50 mcg/dose DISKUS 8/23/2021 at Unknown time  No Yes   Sig: [ADVAIR DISKUS 250-50 MCG/DOSE DISKUS] USE 1 INHALATION TWICE A DAY (1 MONTH SUPPLY ONLY, DUE FOR A FOLLOW UP VIRTUAL VISIT WITH DOCTOR HU)   albuterol (PROAIR HFA;PROVENTIL HFA;VENTOLIN HFA) 90 mcg/actuation inhaler 8/23/2021 at Unknown time  No Yes   Sig: [ALBUTEROL (PROAIR HFA;PROVENTIL HFA;VENTOLIN HFA) 90 MCG/ACTUATION INHALER] Inhale 2 puffs every 6 (six) hours as needed for wheezing or shortness of breath.   fluticasone propionate (FLONASE) 50 mcg/actuation nasal spray Past Month at Unknown time  No Yes   Sig: [FLUTICASONE PROPIONATE (FLONASE) 50 MCG/ACTUATION NASAL SPRAY] 2 sprays into each nostril daily.      Facility-Administered Medications: None     Allergies   No Known Allergies    Physical Exam   Vital Signs: Temp: 99.5  F (37.5  C) Temp src: Tympanic BP: 137/73 Pulse: 104   Resp: 22 SpO2: 95 % O2 Device: None (Room air)    Weight: 0 lbs 0 oz  Appearance: Alert and appropriate sitting up in bed  Eyes: PERRL, EOM grossly intact, conjunctivae and sclerae clear.   Nose: Congestion  Mouth/Throat: Moist mucous membranes.  Neck: Supple, no masses.   Pulmonary: Speaking in full sentences. Normal work of breathing on room air. Moving air. Shallow inspiration. Prolonged expiratory phase with expiratory wheeze throughout  Cardiovascular: Regular rate and rhythm, normal S1 and S2, with no murmurs.  Normal symmetric peripheral pulses and brisk cap refill.  Abdominal: Normoactive bowel sounds, soft, nontender, nondistended, with no masses  Neurologic:  Alert, speech fluid with normal articulation, face symmetric, moving all extremities equally with grossly normal coordination.  Extremities/Back: No deformity, no CVA tenderness. No pedal edema  Skin: No significant rashes, ecchymoses, or lacerations.        Data   Data reviewed today: I reviewed all medications, new labs and imaging results over the last 24 hours. I personally reviewed the chest x-ray image(s) showing no focal opacities.    Recent Labs   Lab 08/24/21  0128   WBC 8.3   HGB 14.1   MCV 89         POTASSIUM 3.8   CHLORIDE 105   CO2 27   BUN 12   CR 1.09   ANIONGAP 5   JARVIS 8.6   *

## 2021-11-13 ENCOUNTER — HEALTH MAINTENANCE LETTER (OUTPATIENT)
Age: 21
End: 2021-11-13

## 2022-07-28 ENCOUNTER — OFFICE VISIT (OUTPATIENT)
Dept: FAMILY MEDICINE | Facility: CLINIC | Age: 22
End: 2022-07-28

## 2022-07-28 VITALS
WEIGHT: 197 LBS | SYSTOLIC BLOOD PRESSURE: 117 MMHG | HEIGHT: 75 IN | DIASTOLIC BLOOD PRESSURE: 69 MMHG | RESPIRATION RATE: 12 BRPM | BODY MASS INDEX: 24.49 KG/M2 | HEART RATE: 59 BPM

## 2022-07-28 DIAGNOSIS — J45.41 MODERATE PERSISTENT ASTHMA WITH ACUTE EXACERBATION: ICD-10-CM

## 2022-07-28 DIAGNOSIS — E73.9 LACTOSE INTOLERANCE: ICD-10-CM

## 2022-07-28 PROCEDURE — 99214 OFFICE O/P EST MOD 30 MIN: CPT | Performed by: FAMILY MEDICINE

## 2022-07-28 RX ORDER — PREDNISONE 20 MG/1
TABLET ORAL
COMMUNITY
Start: 2021-08-24 | End: 2023-12-08

## 2022-07-28 RX ORDER — FLUTICASONE PROPIONATE AND SALMETEROL 250; 50 UG/1; UG/1
1 POWDER RESPIRATORY (INHALATION) 2 TIMES DAILY
Qty: 1 EACH | Refills: 11 | Status: SHIPPED | OUTPATIENT
Start: 2022-07-28 | End: 2023-12-08 | Stop reason: DRUGHIGH

## 2022-07-28 RX ORDER — MONTELUKAST SODIUM 10 MG/1
10 TABLET ORAL AT BEDTIME
Qty: 90 TABLET | Refills: 1 | Status: SHIPPED | OUTPATIENT
Start: 2022-07-28 | End: 2023-05-25

## 2022-07-28 RX ORDER — FLUTICASONE PROPIONATE 50 MCG
1 SPRAY, SUSPENSION (ML) NASAL DAILY
Qty: 16 G | Refills: 4 | Status: SHIPPED | OUTPATIENT
Start: 2022-07-28 | End: 2023-12-08

## 2022-07-28 RX ORDER — ALBUTEROL SULFATE 90 UG/1
2 AEROSOL, METERED RESPIRATORY (INHALATION) EVERY 6 HOURS PRN
Qty: 18 G | Refills: 1 | Status: SHIPPED | OUTPATIENT
Start: 2022-07-28 | End: 2023-12-08

## 2022-07-28 ASSESSMENT — ASTHMA QUESTIONNAIRES
ACT_TOTALSCORE: 12
EMERGENCY_ROOM_LAST_YEAR_TOTAL: ONE
HOSPITALIZATION_OVERNIGHT_LAST_YEAR_TOTAL: ONE
QUESTION_2 LAST FOUR WEEKS HOW OFTEN HAVE YOU HAD SHORTNESS OF BREATH: ONCE A DAY
QUESTION_5 LAST FOUR WEEKS HOW WOULD YOU RATE YOUR ASTHMA CONTROL: SOMEWHAT CONTROLLED
QUESTION_1 LAST FOUR WEEKS HOW MUCH OF THE TIME DID YOUR ASTHMA KEEP YOU FROM GETTING AS MUCH DONE AT WORK, SCHOOL OR AT HOME: SOME OF THE TIME
QUESTION_3 LAST FOUR WEEKS HOW OFTEN DID YOUR ASTHMA SYMPTOMS (WHEEZING, COUGHING, SHORTNESS OF BREATH, CHEST TIGHTNESS OR PAIN) WAKE YOU UP AT NIGHT OR EARLIER THAN USUAL IN THE MORNING: TWO OR THREE NIGHTS A WEEK
QUESTION_4 LAST FOUR WEEKS HOW OFTEN HAVE YOU USED YOUR RESCUE INHALER OR NEBULIZER MEDICATION (SUCH AS ALBUTEROL): ONE OR TWO TIMES PER DAY
ACT_TOTALSCORE: 12

## 2022-07-28 NOTE — LETTER
My Asthma Action Plan    Name: Jonathan Rees   YOB: 2000  Date: 7/28/2022   My doctor: Ana Cristina Morales MD   My clinic: [facility]        My Control Medicine: Fluticasone propionate + salmeterol (Advair Diskus or Wixela Inhub) -  250/50 mcg 1 puff bid   My Rescue Medicine: Albuterol (Proair/Ventolin/Proventil HFA) 2-4 puffs EVERY 4 HOURS as needed. Use a spacer if recommended by your provider.  My Oral Steroid Medicine: prednisone My Asthma Severity:   Moderate Persistent  Know your asthma triggers: upper respiratory infections, dust mites and pollens               GREEN ZONE   Good Control    I feel good    No cough or wheeze    Can work, sleep and play without asthma symptoms       Take your asthma control medicine every day.     1. If exercise triggers your asthma, take your rescue medication    15 minutes before exercise or sports, and    During exercise if you have asthma symptoms  2. Spacer to use with inhaler: If you have a spacer, make sure to use it with your inhaler             YELLOW ZONE Getting Worse  I have ANY of these:    I do not feel good    Cough or wheeze    Chest feels tight    Wake up at night   1. Keep taking your Green Zone medications  2. Start taking your rescue medicine:    every 20 minutes for up to 1 hour. Then every 4 hours for 24-48 hours.  3. If you stay in the Yellow Zone for more than 12-24 hours, contact your doctor.  4. If you do not return to the Green Zone in 12-24 hours or you get worse, start taking your oral steroid medicine if prescribed by your provider.           RED ZONE Medical Alert - Get Help  I have ANY of these:    I feel awful    Medicine is not helping    Breathing getting harder    Trouble walking or talking    Nose opens wide to breathe       1. Take your rescue medicine NOW  2. If your provider has prescribed an oral steroid medicine, start taking it NOW  3. Call your doctor NOW  4. If you are still in the Red Zone after 20  minutes and you have not reached your doctor:    Take your rescue medicine again and    Call 911 or go to the emergency room right away    See your regular doctor within 2 weeks of an Emergency Room or Urgent Care visit for follow-up treatment.          Annual Reminders:  Meet with Asthma Educator,  Flu Shot in the Fall, consider Pneumonia Vaccination for patients with asthma (aged 19 and older).    Pharmacy:    [id number] - [place] [[place], [place] - [address] [facility]  [facility] - [address]  [facility] - [address]    Electronically signed by Ana Cristina Morales MD   Date: 07/28/22                      Asthma Triggers  How To Control Things That Make Your Asthma Worse    Triggers are things that make your asthma worse.  Look at the list below to help you find your triggers and what you can do about them.  You can help prevent asthma flare-ups by staying away from your triggers.      Trigger                                                          What you can do   Cigarette Smoke  Tobacco smoke can make asthma worse. Do not allow smoking in your home, car or around you.  Be sure no one smokes at a child s day care or school.  If you smoke, ask your health care provider for ways to help you quit.  Ask family members to quit too.  Ask your health care provider for a referral to Quit Plan to help you quit smoking, or call 7-937-531-PLAN.     Colds, Flu, Bronchitis  These are common triggers of asthma. Wash your hands often.  Don t touch your eyes, nose or mouth.  Get a flu shot every year.     Dust Mites  These are tiny bugs that live in cloth or carpet. They are too small to see. Wash sheets and blankets in hot water every week.   Encase pillows and mattress in dust mite proof covers.  Avoid having carpet if you can. If you have carpet, vacuum weekly.   Use a dust mask and HEPA vacuum.   Pollen and Outdoor Mold  Some  people are allergic to trees, grass, or weed pollen, or molds. Try to keep your windows closed.  Limit time out doors when pollen count is high.   Ask you health care provider about taking medicine during allergy season.     Animal Dander  Some people are allergic to skin flakes, urine or saliva from pets with fur or feathers. Keep pets with fur or feathers out of your home.    If you can t keep the pet outdoors, then keep the pet out of your bedroom.  Keep the bedroom door closed.  Keep pets off cloth furniture and away from stuffed toys.     Mice, Rats, and Cockroaches   Some people are allergic to the waste from these pests.   Cover food and garbage.  Clean up spills and food crumbs.  Store grease in the refrigerator.   Keep food out of the bedroom.   Indoor Mold  This can be a trigger if your home has high moisture. Fix leaking faucets, pipes, or other sources of water.   Clean moldy surfaces.  Dehumidify basement if it is damp and smelly.   Smoke, Strong Odors, and Sprays  These can reduce air quality. Stay away from strong odors and sprays, such as perfume, powder, hair spray, paints, smoke incense, paint, cleaning products, candles and new carpet.   Exercise or Sports  Some people with asthma have this trigger. Be active!  Ask your doctor about taking medicine before sports or exercise to prevent symptoms.    Warm up for 5-10 minutes before and after sports or exercise.     Other Triggers of Asthma  Cold air:  Cover your nose and mouth with a scarf.  Sometimes laughing or crying can be a trigger.  Some medicines and food can trigger asthma.

## 2022-07-28 NOTE — PROGRESS NOTES
"  [facility] IN-OFFICE Visit  Phone : none    Assessment/Plan:    .(J45.41) Moderate persistent asthma with acute exacerbation  Comment: Was hospitalized for acute asthma exacerbation 8/23-8/25/2021. First time accessing care since hospitalization. Reports using advair inhaler daily, hasn't used prns since around discharge a year ago, but would like a refill today. ACT 12 today. Also noticing increase in allergies since moved in at new house- wondering if this is dust. Has used several OTC prns without relief. Discussed environmental changes to reduce allergen triggers. Will refill rxs today. Also add Singulair/flonase/zyrtec prn. Consider referral to allergist if still uncontrolled.  See asthma action plan in Letters.  MTM appreciated   Plan: fluticasone-salmeterol (ADVAIR DISKUS) 250-50         MCG/ACT inhaler, montelukast (SINGULAIR) 10 MG         tablet, albuterol (PROAIR HFA/PROVENTIL         HFA/VENTOLIN HFA) 108 (90 Base) MCG/ACT         inhaler, fluticasone (FLONASE) 50 MCG/ACT nasal        spray            (E73.9) Lactose intolerance  Comment: Initial concerns about gluten and lactose allergies- gets constipated with gluten, loose stools with lactose. Discussed avoiding simple carbs and increasing fiber diet, but no concern for celiacs due to absence of bloody stools. Uses lactase pills with dairy and this is helpful-encouraged to continue with this. Stay away from milk/icecream. Sharp cheddar/cheeses have lower lactose and may be able to be tolerated.   Plan:     Due for second covid vaccine, especially in context of asthma- pt didn't have time for this today but will schedule soon.   Pt declines blood work today     RTC in 6-8 weeks for follow up.     Manas Castillo is a 22 year old accompanied by his self, presenting for the following health issues:  med check and Establish Care    Has had ongoing issues with allergies- main symptom has been \"a clogged/stuffy nose " "and runny nose\". Has tried a few days worth of flonase, benadryl, Claritin. Has had seasonal allergies in the past, but has never gotten great symptom control. Notes that it is harder to sleep and impact and difficulty exercising at night. Notes that allergies are worse at his house, maybe from dust - no pets. Moved in a month ago and noticed allergies getting worse, is moving out in a few weeks.     Endorses chest tightness with softball games. This is a difference in his normal activity tolerance- is a runner and normally soccer games are ok without symptoms.     Reports using advair inhaler. Hasn't used prednisone, neb, or albuterol for about a year around the time of his hospitalization. Would like a refill on albuterol.     Is concerned for gluten/lactose allergies. Has noticed increase in flatulence. Changes in stools with these foods- with gluten feels like urge to poop but unable to actually produce a bowel movement. When he consumes lactose- diahrea- loose and watery. Has ~4 BMs/day. Has been using more creatine supplement lately and wondering if that has something to do with changes. Has used lactase pills in the past and has noticed this is helpful.        History of Present Illness       Reason for visit:  New Allergies  Symptom onset:  3-4 weeks ago  Symptoms include:  Nose doesn t work  Symptom intensity:  Severe  Symptom progression:  Worsening  Had these symptoms before:  Yes  Has tried/received treatment for these symptoms:  No  What makes it worse:  Sleeping  What makes it better:  Exercise    He eats 2-3 servings of fruits and vegetables daily.He consumes 0 sweetened beverage(s) daily.He exercises with enough effort to increase his heart rate 60 or more minutes per day.  He exercises with enough effort to increase his heart rate 7 days per week. He is missing 1 dose(s) of medications per week.       Review of Systems       Objective    /69 (BP Location: Left arm, Patient Position: Sitting, " "Cuff Size: Adult Large)   Pulse 59   Resp 12   Ht 1.905 m (6' 3\")   Wt 89.4 kg (197 lb)   BMI 24.62 kg/m    Body mass index is 24.62 kg/m .  Physical Exam   Wt Readings from Last 3 Encounters:   07/28/22 89.4 kg (197 lb)   08/24/21 83.9 kg (185 lb)   06/06/19 84.8 kg (187 lb) (90 %, Z= 1.26)*     * Growth percentiles are based on CDC (Boys, 2-20 Years) data.     ACT Total Scores 8/20/2020 7/28/2022   ACT TOTAL SCORE (Goal Greater than or Equal to 20) 20 12   In the past 12 months, how many times did you visit the emergency room for your asthma without being admitted to the hospital? 0 1   In the past 12 months, how many times were you hospitalized overnight because of your asthma? 0 1       No LMP for male patient.    All normal as below except abnormalities include: nasal congestion, runny nose, inflamed nares  General is a  22 year old sitting comfortably in no apparent distress wearing a mask   HEENT:  TM are clear bilaterally.  Eye exams within normal   Neck: Supple without lymphadenopathy or thyromegally  CV: Regular rate and rhythm S1S2 without rubs, murmurs or gallops,   Lungs: Clear to auscultation bilaterally  Abd:  +BS, soft NT/ND,  No masses or organomegally,   Extremities: Warm, No Edema, 2+ Pedal and radial pulses bilaterally  Skin: No lesions or rashes noted  Neuro: Able to ambulate around the exam room with equal movement, strength and normal coordination of the upper and lower extremeties symmetrically      History summarized from1-2: Uncontrolled asthma with hospitalization for exacerbation 8/2022. Eczema.   Old Records-1: Outside allergies, meds, problems and immunizations were reconciled as needed from [facility]  Radiology tests reviewed-1: 202- CXR normal  Lab tests reviewed-1: 2021- CBC/BMP/procalcitonin WNL    Seen by Sandra RICHARD 07/28/22.  Physician Attestation   I, Ana Cristina Morales, saw this patient and have discussed and personally reviewed information outlined in this " document.    I agree with the findings and plan of care as documented in the note.      MD Ana Cristina Grace MD                  .  ..

## 2022-08-01 ENCOUNTER — TELEPHONE (OUTPATIENT)
Dept: FAMILY MEDICINE | Facility: CLINIC | Age: 22
End: 2022-08-01

## 2022-08-01 NOTE — TELEPHONE ENCOUNTER
MTM referral from: [facility] visit (referral by provider)    MTM referral outreach attempt #2 on August 1, 2022 at 10:00 AM      Outcome: Patient not reachable after several attempts, will route to [facility] Pharmacist/Provider as an FYI.  [facility] scheduling number is [phone].  Thank you for the referral.    Chichi Rehman [facility]

## 2022-11-20 ENCOUNTER — HEALTH MAINTENANCE LETTER (OUTPATIENT)
Age: 22
End: 2022-11-20

## 2023-09-18 DIAGNOSIS — J45.41 MODERATE PERSISTENT ASTHMA WITH ACUTE EXACERBATION: ICD-10-CM

## 2023-09-19 RX ORDER — MONTELUKAST SODIUM 10 MG/1
TABLET ORAL
Qty: 90 TABLET | Refills: 0 | Status: SHIPPED | OUTPATIENT
Start: 2023-09-19 | End: 2023-12-08

## 2023-09-20 NOTE — TELEPHONE ENCOUNTER
Pt is scheduled on 12/08 as the first opening and I did inform pt that a asthma plan was sent into his MyChart to complete and send it back whenever he has a chance to fill it out. Completing task.

## 2023-11-25 ENCOUNTER — HEALTH MAINTENANCE LETTER (OUTPATIENT)
Age: 23
End: 2023-11-25

## 2023-12-08 ENCOUNTER — OFFICE VISIT (OUTPATIENT)
Dept: FAMILY MEDICINE | Facility: CLINIC | Age: 23
End: 2023-12-08

## 2023-12-08 VITALS
HEART RATE: 72 BPM | OXYGEN SATURATION: 97 % | HEIGHT: 76 IN | BODY MASS INDEX: 26.18 KG/M2 | SYSTOLIC BLOOD PRESSURE: 132 MMHG | TEMPERATURE: 97.8 F | DIASTOLIC BLOOD PRESSURE: 77 MMHG | WEIGHT: 215 LBS | RESPIRATION RATE: 21 BRPM

## 2023-12-08 DIAGNOSIS — J30.81 ALLERGIC RHINITIS DUE TO ANIMALS: ICD-10-CM

## 2023-12-08 DIAGNOSIS — I45.10 RIGHT BUNDLE BRANCH BLOCK: ICD-10-CM

## 2023-12-08 DIAGNOSIS — J45.41 MODERATE PERSISTENT ASTHMA WITH ACUTE EXACERBATION: Primary | ICD-10-CM

## 2023-12-08 DIAGNOSIS — R07.89 OTHER CHEST PAIN: ICD-10-CM

## 2023-12-08 DIAGNOSIS — Z23 ENCOUNTER FOR IMMUNIZATION: ICD-10-CM

## 2023-12-08 PROCEDURE — 90715 TDAP VACCINE 7 YRS/> IM: CPT | Performed by: FAMILY MEDICINE

## 2023-12-08 PROCEDURE — 90471 IMMUNIZATION ADMIN: CPT | Performed by: FAMILY MEDICINE

## 2023-12-08 PROCEDURE — 90472 IMMUNIZATION ADMIN EACH ADD: CPT | Performed by: FAMILY MEDICINE

## 2023-12-08 PROCEDURE — 93005 ELECTROCARDIOGRAM TRACING: CPT | Performed by: FAMILY MEDICINE

## 2023-12-08 PROCEDURE — 93010 ELECTROCARDIOGRAM REPORT: CPT | Performed by: GENERAL ACUTE CARE HOSPITAL

## 2023-12-08 PROCEDURE — 99214 OFFICE O/P EST MOD 30 MIN: CPT | Mod: 25 | Performed by: FAMILY MEDICINE

## 2023-12-08 PROCEDURE — 90677 PCV20 VACCINE IM: CPT | Performed by: FAMILY MEDICINE

## 2023-12-08 RX ORDER — CETIRIZINE HYDROCHLORIDE 10 MG/1
10 TABLET ORAL DAILY
Qty: 90 TABLET | Refills: 4 | Status: SHIPPED | OUTPATIENT
Start: 2023-12-08

## 2023-12-08 RX ORDER — MONTELUKAST SODIUM 10 MG/1
TABLET ORAL
Qty: 90 TABLET | Refills: 4 | Status: SHIPPED | OUTPATIENT
Start: 2023-12-08

## 2023-12-08 RX ORDER — FLUTICASONE PROPIONATE 50 MCG
1 SPRAY, SUSPENSION (ML) NASAL DAILY
Qty: 16 G | Refills: 4 | Status: SHIPPED | OUTPATIENT
Start: 2023-12-08

## 2023-12-08 RX ORDER — FLUTICASONE PROPIONATE AND SALMETEROL 500; 50 UG/1; UG/1
1 POWDER RESPIRATORY (INHALATION) EVERY 12 HOURS
Qty: 60 EACH | Refills: 11 | Status: SHIPPED | OUTPATIENT
Start: 2023-12-08

## 2023-12-08 RX ORDER — FLUTICASONE PROPIONATE AND SALMETEROL 250; 50 UG/1; UG/1
1 POWDER RESPIRATORY (INHALATION) 2 TIMES DAILY
Qty: 1 EACH | Refills: 11 | Status: CANCELLED | OUTPATIENT
Start: 2023-12-08

## 2023-12-08 RX ORDER — ALBUTEROL SULFATE 90 UG/1
2 AEROSOL, METERED RESPIRATORY (INHALATION) EVERY 6 HOURS PRN
Qty: 18 G | Refills: 1 | Status: SHIPPED | OUTPATIENT
Start: 2023-12-08

## 2023-12-08 RX ORDER — PREDNISONE 20 MG/1
40 TABLET ORAL DAILY
Qty: 10 TABLET | Refills: 1 | Status: SHIPPED | OUTPATIENT
Start: 2023-12-08 | End: 2023-12-13

## 2023-12-08 ASSESSMENT — ASTHMA QUESTIONNAIRES: ACT_TOTALSCORE: 18

## 2023-12-08 NOTE — LETTER
My Asthma Action Plan    Name: Jonathan Rees   YOB: 2000  Date: 12/8/2023   My doctor: Ana Cristina Morales MD   My clinic: [facility]        My Control Medicine: Fluticasone propionate + salmeterol (Advair Diskus or Wixela Inhub) -  500/50 mcg bid  My Rescue Medicine: Albuterol (Proair/Ventolin/Proventil HFA) 2-4 puffs EVERY 4 HOURS as needed. Use a spacer if recommended by your provider.  My Oral Steroid Medicine: prednisone  My Asthma Severity:   Moderate Persistent  Know your asthma triggers: upper respiratory infections, dust mites, pollens, animal dander, and exercise or sports               GREEN ZONE   Good Control  I feel good  No cough or wheeze  Can work, sleep and play without asthma symptoms       Take your asthma control medicine every day.     If exercise triggers your asthma, take your rescue medication  15 minutes before exercise or sports, and  During exercise if you have asthma symptoms  Spacer to use with inhaler: If you have a spacer, make sure to use it with your inhaler             YELLOW ZONE Getting Worse  I have ANY of these:  I do not feel good  Cough or wheeze  Chest feels tight  Wake up at night   Keep taking your Green Zone medications  Start taking your rescue medicine:  every 20 minutes for up to 1 hour. Then every 4 hours for 24-48 hours.  If you stay in the Yellow Zone for more than 12-24 hours, contact your doctor.  If you do not return to the Green Zone in 12-24 hours or you get worse, start taking your oral steroid medicine if prescribed by your provider.           RED ZONE Medical Alert - Get Help  I have ANY of these:  I feel awful  Medicine is not helping  Breathing getting harder  Trouble walking or talking  Nose opens wide to breathe       Take your rescue medicine NOW  If your provider has prescribed an oral steroid medicine, start taking it NOW  Call your doctor NOW  If you are still in the Red Zone after 20 minutes and you have not  reached your doctor:  Take your rescue medicine again and  Call 911 or go to the emergency room right away    See your regular doctor within 2 weeks of an Emergency Room or Urgent Care visit for follow-up treatment.          Annual Reminders:  Meet with Asthma Educator,  Flu Shot in the Fall, consider Pneumonia Vaccination for patients with asthma (aged 19 and older).    Pharmacy:    [id number] - [place] [[place]], [place] - [address] [facility]  [facility] - [address]  [facility] - [address]    Electronically signed by Ana Cristina Morales MD   Date: 12/08/23                      Asthma Triggers  How To Control Things That Make Your Asthma Worse    Triggers are things that make your asthma worse.  Look at the list below to help you find your triggers and what you can do about them.  You can help prevent asthma flare-ups by staying away from your triggers.      Trigger                                                          What you can do   Cigarette Smoke  Tobacco smoke can make asthma worse. Do not allow smoking in your home, car or around you.  Be sure no one smokes at a child s day care or school.  If you smoke, ask your health care provider for ways to help you quit.  Ask family members to quit too.  Ask your health care provider for a referral to Quit Plan to help you quit smoking, or call 1-118-862-PLAN.     Colds, Flu, Bronchitis  These are common triggers of asthma. Wash your hands often.  Don t touch your eyes, nose or mouth.  Get a flu shot every year.     Dust Mites  These are tiny bugs that live in cloth or carpet. They are too small to see. Wash sheets and blankets in hot water every week.   Encase pillows and mattress in dust mite proof covers.  Avoid having carpet if you can. If you have carpet, vacuum weekly.   Use a dust mask and HEPA vacuum.   Pollen and Outdoor Mold  Some people are allergic to trees,  grass, or weed pollen, or molds. Try to keep your windows closed.  Limit time out doors when pollen count is high.   Ask you health care provider about taking medicine during allergy season.     Animal Dander  Some people are allergic to skin flakes, urine or saliva from pets with fur or feathers. Keep pets with fur or feathers out of your home.    If you can t keep the pet outdoors, then keep the pet out of your bedroom.  Keep the bedroom door closed.  Keep pets off cloth furniture and away from stuffed toys.     Mice, Rats, and Cockroaches   Some people are allergic to the waste from these pests.   Cover food and garbage.  Clean up spills and food crumbs.  Store grease in the refrigerator.   Keep food out of the bedroom.   Indoor Mold  This can be a trigger if your home has high moisture. Fix leaking faucets, pipes, or other sources of water.   Clean moldy surfaces.  Dehumidify basement if it is damp and smelly.   Smoke, Strong Odors, and Sprays  These can reduce air quality. Stay away from strong odors and sprays, such as perfume, powder, hair spray, paints, smoke incense, paint, cleaning products, candles and new carpet.   Exercise or Sports  Some people with asthma have this trigger. Be active!  Ask your doctor about taking medicine before sports or exercise to prevent symptoms.    Warm up for 5-10 minutes before and after sports or exercise.     Other Triggers of Asthma  Cold air:  Cover your nose and mouth with a scarf.  Sometimes laughing or crying can be a trigger.  Some medicines and food can trigger asthma.

## 2023-12-08 NOTE — PROGRESS NOTES
Assessment & Plan     Moderate persistent asthma with acute exacerbation      8/20/2020    10:13 AM 7/28/2022     2:23 PM 12/8/2023     2:25 PM   ACT Total Scores   ACT TOTAL SCORE (Goal Greater than or Equal to 20) 20 12 18   In the past 12 months, how many times did you visit the emergency room for your asthma without being admitted to the hospital? 0 1 0   In the past 12 months, how many times were you hospitalized overnight because of your asthma? 0 1 1     Uncontrolled- reviewed triggers today including new cat in his apartment. .  Rx for prednisone to use prn.  Flonase and zyrtec to help with allergies.  Avoid cat- keep bedroom door closed, etc.  Singulair hs.  Albuterol prn and prior to exertion.  Advair 500/50 bid.    - montelukast (SINGULAIR) 10 MG tablet  Dispense: 90 tablet; Refill: 4  - predniSONE (DELTASONE) 20 MG tablet  Dispense: 10 tablet; Refill: 1  - fluticasone (FLONASE) 50 MCG/ACT nasal spray  Dispense: 16 g; Refill: 4  - albuterol (PROAIR HFA/PROVENTIL HFA/VENTOLIN HFA) 108 (90 Base) MCG/ACT inhaler  Dispense: 18 g; Refill: 1    Encounter for immunization  Declines other vaccines recommended today   - Pneumococcal 20 Valent Conjugate (Prevnar 20)  - TDAP 10-64Y (ADACEL,BOOSTRIX)    Allergic rhinitis due to animals  Reviewed how to limit contact with cat in his apartment.    - fluticasone-salmeterol (ADVAIR) 500-50 MCG/ACT inhaler  Dispense: 60 each; Refill: 11  - cetirizine (ZYRTEC) 10 MG tablet  Dispense: 90 tablet; Refill: 4    Other chest pain  Exertional- could be related to uncontrolled asthma but pt notes it feels a little different and is affecting his activity.  EKG does show RBB- wll have pt follow-up with cardiology in near future to review risks and determine if further testing is needed.  Reviewed warning signs and when to present to ED.    - EKG 12-lead, tracing only  - Adult Cardiology Eval  Referral    Right bundle branch block  - Adult Cardiology Eval   "Referral           BMI:   Estimated body mass index is 26.18 kg/m  as calculated from the following:    Height as of this encounter: 1.93 m (6' 3.98\").    Weight as of this encounter: 97.5 kg (215 lb).   Weight management plan: Discussed healthy diet and exercise guidelines      MD RENATO [facility]    Manas Castillo is a 23 year old, presenting for the following health issues:  RECHECK and tightness of chest         12/8/2023     2:32 PM   Additional Questions   Roomed by M   Accompanied by self   Singulair 10mg hs  Advair 250/50 1 bid  Rescue inhaler before exercise   Roommate has a cat that is new and making allergies worse.     Noting chest pain with exertion- was playing soccer and 40minutes in getting a pain in his chest and ribs so that he has to slow down and take a 2min break.  Has to completely stop activity due to discomfort along with SOB where he struggles to get his air out.  Using his albuterol inhaler does help but not immediately.  Does use albuterol prior to soccer/exercise.  Going on about a month and consistent.  No palpitations.  No dizziness. No nausea.          8/20/2020    10:13 AM 7/28/2022     2:23 PM 12/8/2023     2:25 PM   ACT Total Scores   ACT TOTAL SCORE (Goal Greater than or Equal to 20) 20 12 18   In the past 12 months, how many times did you visit the emergency room for your asthma without being admitted to the hospital? 0 1 0   In the past 12 months, how many times were you hospitalized overnight because of your asthma? 0 1 1       History of Present Illness     Asthma:  He presents for follow up of asthma.  He has no cough, no wheezing, and some shortness of breath.  He is using a relief medication a few times a week. He typically misses taking his controller medication 1 time(s) per week. Patient is aware of the following triggers: animal dander, cold air and exercise or sports. The patient has not had a visit to the Emergency Room, Urgent " "Care or Hospital due to asthma since the last clinic visit.     He eats 2-3 servings of fruits and vegetables daily.He consumes 0 sweetened beverage(s) daily.He exercises with enough effort to increase his heart rate 60 or more minutes per day.  He exercises with enough effort to increase his heart rate 6 days per week. He is missing 1 dose(s) of medications per week.  He is not taking prescribed medications regularly due to cost of medication.           Review of Systems       Objective    /77 (BP Location: Left arm, Patient Position: Sitting, Cuff Size: Adult Large)   Pulse 72   Temp 97.8  F (36.6  C) (Temporal)   Resp 21   Ht 1.93 m (6' 3.98\")   Wt 97.5 kg (215 lb)   SpO2 97%   BMI 26.18 kg/m    Body mass index is 26.18 kg/m .  Physical Exam   Complete 10 point ROS completed today as part of the exam and patient denies any symptoms as reviewed in HPI     Wt Readings from Last 3 Encounters:   12/08/23 97.5 kg (215 lb)   07/28/22 89.4 kg (197 lb)   08/24/21 83.9 kg (185 lb)       No LMP for male patient.    All normal as below except abnormalities include: grossly normal exam.  No wheezing and moving air well today.  No chest wall tenderness on exam.  Normal skin and chest wall exam.   General is a  23 year old sitting comfortably in no apparent distress   HEENT:  TM are clear bilaterally.  Eye exams within normal   Neck: Supple without lymphadenopathy or thyromegally  CV: Regular rate and rhythm S1S2 without rubs, murmurs or gallops,   Lungs: Clear to auscultation bilaterally  Abd:  +BS, soft NT/ND,  No masses or organomegally,   Extremities: Warm, No Edema, 2+ Pedal and radial pulses bilaterally  Skin: No lesions or rashes noted  Neuro: Able to ambulate around the exam room with equal movement, strength and normal coordination of the upper and lower extremeties symmetrically    Care Everywhere reviewed today   Radiology tests reviewed-1: CXR 3/2023 RLL pneumonia ,   Lab tests reviewed-1: 2023- neg " troponin plt 148, hgb 12.9 (pt declined labs today)   EKG 3/2023 reviewed- NSR-non specific T wave abnormality, borderline EKG      Follow-up Visit   Expected date:  Apr 08, 2024 (Approximate)      Follow Up Appointment Details:     Follow-up with whom?: PCP    Follow-Up for what?: Adult Preventive    Any Additional Chronic Condition Management?: Asthma    How?: In Person                     Ana Cristina Morales MD          Prior to immunization administration, verified patients identity using patient s name and date of birth. Please see Immunization Activity for additional information.     Screening Questionnaire for Adult Immunization    Are you sick today?   No   Do you have allergies to medications, food, a vaccine component or latex?   No   Have you ever had a serious reaction after receiving a vaccination?   No   Do you have a long-term health problem with heart, lung, kidney, or metabolic disease (e.g., diabetes), asthma, a blood disorder, no spleen, complement component deficiency, a cochlear implant, or a spinal fluid leak?  Are you on long-term aspirin therapy?   No   Do you have cancer, leukemia, HIV/AIDS, or any other immune system problem?   No   Do you have a parent, brother, or sister with an immune system problem?   No   In the past 3 months, have you taken medications that affect  your immune system, such as prednisone, other steroids, or anticancer drugs; drugs for the treatment of rheumatoid arthritis, Crohn s disease, or psoriasis; or have you had radiation treatments?   No   Have you had a seizure, or a brain or other nervous system problem?   No   During the past year, have you received a transfusion of blood or blood    products, or been given immune (gamma) globulin or antiviral drug?   No   For women: Are you pregnant or is there a chance you could become       pregnant during the next month?   No   Have you received any vaccinations in the past 4 weeks?   No     Immunization questionnaire answers  were all negative.      Patient instructed to remain in clinic for 15 minutes afterwards, and to report any adverse reactions.     Screening performed by RENATO Paredes MA on 12/8/2023 at 2:38 PM.

## 2023-12-11 LAB
ATRIAL RATE - MUSE: 62 BPM
DIASTOLIC BLOOD PRESSURE - MUSE: NORMAL MMHG
INTERPRETATION ECG - MUSE: NORMAL
P AXIS - MUSE: -9 DEGREES
PR INTERVAL - MUSE: 168 MS
QRS DURATION - MUSE: 106 MS
QT - MUSE: 418 MS
QTC - MUSE: 424 MS
R AXIS - MUSE: 46 DEGREES
SYSTOLIC BLOOD PRESSURE - MUSE: NORMAL MMHG
T AXIS - MUSE: 43 DEGREES
VENTRICULAR RATE- MUSE: 62 BPM

## 2024-01-17 ENCOUNTER — OFFICE VISIT (OUTPATIENT)
Dept: CARDIOLOGY | Facility: CLINIC | Age: 24
End: 2024-01-17
Attending: FAMILY MEDICINE
Payer: COMMERCIAL

## 2024-01-17 VITALS
SYSTOLIC BLOOD PRESSURE: 134 MMHG | BODY MASS INDEX: 26.55 KG/M2 | WEIGHT: 218 LBS | DIASTOLIC BLOOD PRESSURE: 73 MMHG | HEART RATE: 66 BPM | OXYGEN SATURATION: 100 %

## 2024-01-17 DIAGNOSIS — R07.9 EXERTIONAL CHEST PAIN: Primary | ICD-10-CM

## 2024-01-17 DIAGNOSIS — R03.0 ELEVATED BLOOD PRESSURE READING WITHOUT DIAGNOSIS OF HYPERTENSION: ICD-10-CM

## 2024-01-17 DIAGNOSIS — I45.10 RIGHT BUNDLE BRANCH BLOCK: ICD-10-CM

## 2024-01-17 DIAGNOSIS — R07.89 OTHER CHEST PAIN: ICD-10-CM

## 2024-01-17 PROCEDURE — 99202 OFFICE O/P NEW SF 15 MIN: CPT | Performed by: INTERNAL MEDICINE

## 2024-01-17 NOTE — PATIENT INSTRUCTIONS
Thank you for coming to the [facility] [place]; please note the following instructions:    1. Dr. Márquez recommends buying a blood pressure cuff and checking your blood pressure at home. Goal blood pressure is less than 130/80.     When checking your blood pressure at home:  ~upper arm cuff is preferred versus wrist cuff due to accuracy  ~Sit in a chair  ~Rest for 5 minutes  ~Avoid alcohol, nicotine, caffeine, exercise, food or drink 30 minutes prior  ~urinate prior to checking if needed  ~Elbow is at about heart level  ~Feet flat on the floor, no crossing legs or feet  ~No talking, minimal movement   ~Place cuff on bare skin     2. Exercise stress echocardiogram    3. Follow-up after testing    4. 24 hour ambulatory blood pressure monitoring        If you have any questions regarding your visit please contact your care team:     Cardiology  Telephone Number   Maria Del Carmen ONEILL, RN  Sheryl POZO, RN  Katheryn LIU, RN  Carol Ann DOMINIQUE, RMLEXY TEE, FILOMENA LOPEZ, [place]  Alana ROBIN, [facility] [phone] (option 1)   For scheduling appts:     [phone] (select option 1)       For the Device Clinic (Pacemakers and ICD's)  RN's :  Maggie Brand   During business hours: [phone]    *After business hours:  [phone] (select option 4)      Normal test result notifications will be released via [employer] or mailed within 7 business days.  All other test results, will be communicated via telephone once reviewed by your cardiologist.    If you need a medication refill please contact your pharmacy.  Please allow 3 business days for your refill to be completed.    As always, thank you for trusting us with your health care needs!

## 2024-01-17 NOTE — NURSING NOTE
"Chief Complaint   Patient presents with    Chest Pain     New General Cardiology for Other chest pain- Right bundle branch block       Initial /71 (BP Location: Right arm, Patient Position: Chair, Cuff Size: Adult Large)   Pulse 66   Wt 98.9 kg (218 lb)   SpO2 100%   BMI 26.55 kg/m   Estimated body mass index is 26.55 kg/m  as calculated from the following:    Height as of 12/8/23: 1.93 m (6' 3.98\").    Weight as of this encounter: 98.9 kg (218 lb)..  BP completed using cuff size: FILOMENA Rodriguez    "

## 2024-01-17 NOTE — PROGRESS NOTES
[facility]      Referring provider:Ana Cristina Morales MD     HPI: Mr. Jonathan Rees is a 23 year old  male with PMH significant for  -Asthma    Patient recently seen in primary care clinic and reported exertional chest discomfort.  EKG showed right bundle branch block.  He is recommended to see cardiology.  He has no prior history of cardiac disease.  Patient tells me that he has been an active runner all his life.  Up until a year ago he used to run 12 miles without any symptoms.  Over the last 1 year when he is running, playing soccer or basketball he needs to take a break due to chest pain, shortness of breath and palpitations (usually starts feeling 6 miles into run).  With rest symptoms go away and he can continue exercising.  All of these symptoms are exercise related and he does not feel any of these symptoms at other times.  Denies syncope, lower extremity edema, dizziness.  He does not smoke.  No alcohol.  No drug abuse.  Blood pressure is borderline elevated in clinic today.  He does not carry a diagnosis of hypertension.  No diabetes.  Family history is unremarkable.  He tells me that he had asthma all his life.  He is on inhalers.  Chest x-ray on 12/8/2023 shows normal cardiac silhouette and pulmonary vasculature.  Clear lungs.  No pleural effusion.    I reviewed patient's EKG 12/8/2023 which shows sinus rhythm, incomplete right bundle branch block, V1 through V4 biphasic T waves.    Medications, personal, family, and social history reviewed with patient and revised.    PAST MEDICAL HISTORY:  Past Medical History:   Diagnosis Date    Uncomplicated asthma        CURRENT MEDICATIONS:  Current Outpatient Medications   Medication Sig Dispense Refill    albuterol (PROAIR HFA/PROVENTIL HFA/VENTOLIN HFA) 108 (90 Base) MCG/ACT inhaler Inhale 2 puffs into the lungs every 6 hours as needed for wheezing 18 g 1    cetirizine (ZYRTEC) 10  MG tablet Take 1 tablet (10 mg) by mouth daily 90 tablet 4    fluticasone (FLONASE) 50 MCG/ACT nasal spray Spray 1 spray into both nostrils daily 16 g 4    fluticasone-salmeterol (ADVAIR) 500-50 MCG/ACT inhaler Inhale 1 puff into the lungs every 12 hours 60 each 11    montelukast (SINGULAIR) 10 MG tablet TAKE ONE TABLET BY MOUTH AT BEDTIME 90 tablet 4       PAST SURGICAL HISTORY:  No past surgical history on file.    ALLERGIES:     Allergies   Allergen Reactions    Molds & Smuts     Wheat Bran        FAMILY HISTORY:  Family History   Problem Relation Age of Onset    LUNG DISEASE Mother     LUNG DISEASE Father     Asthma Father     Asthma Sister     Asthma Brother     Asthma Brother     LUNG DISEASE Other         grandmother         SOCIAL HISTORY:  Social History     Tobacco Use    Smoking status: Never    Smokeless tobacco: Never    Tobacco comments:     no tob exp   Substance Use Topics    Alcohol use: No    Drug use: No       ROS:   Constitutional: No fever, chills, or sweats. Weight stable.   Cardiovascular: As per HPI.   \  Exam:  /71 (BP Location: Right arm, Patient Position: Chair, Cuff Size: Adult Large)   Pulse 66   Wt 98.9 kg (218 lb)   SpO2 100%   BMI 26.55 kg/m    GENERAL APPEARANCE: alert and no distress  HEENT: no icterus, no central cyanosis  LYMPH/NECK: no adenopathy, no asymmetry, JVP not elevated  RESPIRATORY: lungs clear to auscultation - no rales, rhonchi or wheezes, no use of accessory muscles, no retractions, respirations are unlabored, normal respiratory rate  CARDIOVASCULAR: regular rhythm, normal S1, S2, no S3 or S4 and no murmur, click or rub, precordium quiet with normal PMI.  GI: soft, non tender  EXTREMITIES: no edema  NEURO: alert, normal speech,and affect  SKIN: no ecchymoses, no rashes     I have reviewed the labs and personally reviewed the imaging below and made my comment in the assessment and plan.    Labs:  CBC RESULTS:   Lab Results   Component Value Date    WBC 8.3  08/24/2021    RBC 4.62 08/24/2021    HGB 14.1 08/24/2021    HCT 41.3 08/24/2021    MCV 89 08/24/2021    MCH 30.5 08/24/2021    MCHC 34.1 08/24/2021    RDW 13.2 08/24/2021     08/24/2021       BMP RESULTS:  Lab Results   Component Value Date     08/24/2021    POTASSIUM 3.8 08/24/2021    CHLORIDE 105 08/24/2021    CO2 27 08/24/2021    ANIONGAP 5 08/24/2021     (H) 08/24/2021    BUN 12 08/24/2021    CR 1.09 08/24/2021    GFRESTIMATED >90 08/24/2021    JARVIS 8.6 08/24/2021        Echocardiogram  No results found for this or any previous visit (from the past 8760 hour(s)).    EKG 12/8/2023 shows sinus rhythm, incomplete right bundle branch block, biphasic T waves V1 to V4.          Assessment and Plan:     # Exertional chest pain, shortness of breath and palpitations  -Physical exam is benign today. Symptoms over the last 1 year with exertion only.  He is euvolemic on exam.  Blood pressure is mildly elevated.  Recent chest x-ray is normal.  EKG shows right bundle branch block and T wave changes V1 through V4.  QRS is slightly prolonged in right precordial leads.  ARVC?.  I will order exercise stress echocardiogram.  The patient is agreeable.    # Elevated blood pressure without diagnosis of hypertension  -Ambulatory blood pressure monitoring for 24 hours  -Recommend to monitor blood pressure at home.  He will buy blood pressure cuff.    Return to clinic after testing is completed.    Total time spent today for this visit is 20 minutes including precharting, face-to-face clinic visit, review of labs/imaging and medical documentation.    Bren ELIZONDO MD  [facility] Division of Cardiology  Pager [phone]

## 2024-01-17 NOTE — LETTER
1/17/2024      RE: Jonathan Rees  [address]  [place] [address]       Dear Colleague,    Thank you for the opportunity to participate in the care of your patient, Jonathan Rees, at the [facility]. Please see a copy of my visit note below.                                                                General Cardiology Clinic-[facility]      Referring provider:Ana Cristina Morales MD     HPI: Mr. Jonathan Rees is a 23 year old  male with PMH significant for  -Asthma    Patient recently seen in primary care clinic and reported exertional chest discomfort.  EKG showed right bundle branch block.  He is recommended to see cardiology.  He has no prior history of cardiac disease.  Patient tells me that he has been an active runner all his life.  Up until a year ago he used to run 12 miles without any symptoms.  Over the last 1 year when he is running, playing soccer or basketball he needs to take a break due to chest pain, shortness of breath and palpitations (usually starts feeling 6 miles into run).  With rest symptoms go away and he can continue exercising.  All of these symptoms are exercise related and he does not feel any of these symptoms at other times.  Denies syncope, lower extremity edema, dizziness.  He does not smoke.  No alcohol.  No drug abuse.  Blood pressure is borderline elevated in clinic today.  He does not carry a diagnosis of hypertension.  No diabetes.  Family history is unremarkable.  He tells me that he had asthma all his life.  He is on inhalers.  Chest x-ray on 12/8/2023 shows normal cardiac silhouette and pulmonary vasculature.  Clear lungs.  No pleural effusion.    I reviewed patient's EKG 12/8/2023 which shows sinus rhythm, incomplete right bundle branch block, V1 through V4 biphasic T waves.    Medications, personal, family, and social history reviewed with patient and revised.    PAST MEDICAL  HISTORY:  Past Medical History:   Diagnosis Date    Uncomplicated asthma        CURRENT MEDICATIONS:  Current Outpatient Medications   Medication Sig Dispense Refill    albuterol (PROAIR HFA/PROVENTIL HFA/VENTOLIN HFA) 108 (90 Base) MCG/ACT inhaler Inhale 2 puffs into the lungs every 6 hours as needed for wheezing 18 g 1    cetirizine (ZYRTEC) 10 MG tablet Take 1 tablet (10 mg) by mouth daily 90 tablet 4    fluticasone (FLONASE) 50 MCG/ACT nasal spray Spray 1 spray into both nostrils daily 16 g 4    fluticasone-salmeterol (ADVAIR) 500-50 MCG/ACT inhaler Inhale 1 puff into the lungs every 12 hours 60 each 11    montelukast (SINGULAIR) 10 MG tablet TAKE ONE TABLET BY MOUTH AT BEDTIME 90 tablet 4       PAST SURGICAL HISTORY:  No past surgical history on file.    ALLERGIES:     Allergies   Allergen Reactions    Molds & Smuts     Wheat Bran        FAMILY HISTORY:  Family History   Problem Relation Age of Onset    LUNG DISEASE Mother     LUNG DISEASE Father     Asthma Father     Asthma Sister     Asthma Brother     Asthma Brother     LUNG DISEASE Other         grandmother         SOCIAL HISTORY:  Social History     Tobacco Use    Smoking status: Never    Smokeless tobacco: Never    Tobacco comments:     no tob exp   Substance Use Topics    Alcohol use: No    Drug use: No       ROS:   Constitutional: No fever, chills, or sweats. Weight stable.   Cardiovascular: As per HPI.   \  Exam:  /71 (BP Location: Right arm, Patient Position: Chair, Cuff Size: Adult Large)   Pulse 66   Wt 98.9 kg (218 lb)   SpO2 100%   BMI 26.55 kg/m    GENERAL APPEARANCE: alert and no distress  HEENT: no icterus, no central cyanosis  LYMPH/NECK: no adenopathy, no asymmetry, JVP not elevated  RESPIRATORY: lungs clear to auscultation - no rales, rhonchi or wheezes, no use of accessory muscles, no retractions, respirations are unlabored, normal respiratory rate  CARDIOVASCULAR: regular rhythm, normal S1, S2, no S3 or S4 and no murmur, click or  rub, precordium quiet with normal PMI.  GI: soft, non tender  EXTREMITIES: no edema  NEURO: alert, normal speech,and affect  SKIN: no ecchymoses, no rashes     I have reviewed the labs and personally reviewed the imaging below and made my comment in the assessment and plan.    Labs:  CBC RESULTS:   Lab Results   Component Value Date    WBC 8.3 08/24/2021    RBC 4.62 08/24/2021    HGB 14.1 08/24/2021    HCT 41.3 08/24/2021    MCV 89 08/24/2021    MCH 30.5 08/24/2021    MCHC 34.1 08/24/2021    RDW 13.2 08/24/2021     08/24/2021       BMP RESULTS:  Lab Results   Component Value Date     08/24/2021    POTASSIUM 3.8 08/24/2021    CHLORIDE 105 08/24/2021    CO2 27 08/24/2021    ANIONGAP 5 08/24/2021     (H) 08/24/2021    BUN 12 08/24/2021    CR 1.09 08/24/2021    GFRESTIMATED >90 08/24/2021    JARVIS 8.6 08/24/2021        Echocardiogram  No results found for this or any previous visit (from the past 8760 hour(s)).    EKG 12/8/2023 shows sinus rhythm, incomplete right bundle branch block, biphasic T waves V1 to V4.          Assessment and Plan:     # Exertional chest pain, shortness of breath and palpitations  -Physical exam is benign today. Symptoms over the last 1 year with exertion only.  He is euvolemic on exam.  Blood pressure is mildly elevated.  Recent chest x-ray is normal.  EKG shows right bundle branch block and T wave changes V1 through V4.  QRS is slightly prolonged in right precordial leads.  ARVC?.  I will order exercise stress echocardiogram.  The patient is agreeable.    # Elevated blood pressure without diagnosis of hypertension  -Ambulatory blood pressure monitoring for 24 hours  -Recommend to monitor blood pressure at home.  He will buy blood pressure cuff.    Return to clinic after testing is completed.    Total time spent today for this visit is 20 minutes including precharting, face-to-face clinic visit, review of labs/imaging and medical documentation.    Bren ELIZONDO MD  [facility] Division of Cardiology  Pager [phone]

## 2024-01-19 ENCOUNTER — HOSPITAL ENCOUNTER (OUTPATIENT)
Dept: CARDIOLOGY | Facility: CLINIC | Age: 24
Discharge: HOME | End: 2024-01-19
Attending: INTERNAL MEDICINE | Admitting: INTERNAL MEDICINE
Payer: COMMERCIAL

## 2024-01-19 DIAGNOSIS — R03.0 ELEVATED BLOOD PRESSURE READING WITHOUT DIAGNOSIS OF HYPERTENSION: ICD-10-CM

## 2024-01-19 PROCEDURE — 93786 AMBL BP MNTR W/SW REC ONLY: CPT

## 2024-01-19 PROCEDURE — 93790 AMBL BP MNTR W/SW I&R: CPT | Performed by: INTERNAL MEDICINE

## 2024-02-01 ENCOUNTER — TELEPHONE (OUTPATIENT)
Dept: CARDIOLOGY | Facility: CLINIC | Age: 24
End: 2024-02-01
Payer: COMMERCIAL

## 2024-02-01 NOTE — TELEPHONE ENCOUNTER
Called and LVM for patient to return call to clinic scheduler to push out  Can follow-up after testing.    DC Zee

## 2024-02-01 NOTE — TELEPHONE ENCOUNTER
----- Message from Sheryl Gaitan RN sent at 2024  9:02 AM CST -----  Regardin/6 appt  This patient saw Dr. Márquez on 24 and is scheduled to see her on 24. He was to have ambulatory bp and exercise stress echo done and then follow up after testing. He completed the ambulatory bp but rescheduled the exercise stress echo to 24. Wondering if he is willing to push follow up until after he has the stress echo so that all the testing is completed prior. Thanks!

## 2024-02-02 NOTE — TELEPHONE ENCOUNTER
Kerrie message sent to patient asking if he is comfortable pushing out  Can follow-up after stress echo.    DC Zee

## 2024-02-05 NOTE — TELEPHONE ENCOUNTER
Called and LVM for patient to return call to clinic scheduler to reschedule appointment on 2/6/24 due to provider being out ill.    DC Zee

## 2024-02-05 NOTE — TELEPHONE ENCOUNTER
Called and LVM for patient asking that he return call to let us know if he would like to push out his appointment with Dr. Márquez until after his testing is complete.    DC Zee

## 2024-02-06 NOTE — TELEPHONE ENCOUNTER
Patient appointment on 2/6/24 cancelled by writer. Letter sent to patient to reschedule appointment with Dr. Márquez. Maximum phone call attempts have been met to reach patient.    DC Zee

## 2024-02-14 ENCOUNTER — ANCILLARY PROCEDURE (OUTPATIENT)
Dept: CARDIOLOGY | Facility: CLINIC | Age: 24
End: 2024-02-14
Attending: INTERNAL MEDICINE
Payer: COMMERCIAL

## 2024-02-14 DIAGNOSIS — I45.10 RIGHT BUNDLE BRANCH BLOCK: ICD-10-CM

## 2024-02-14 PROCEDURE — 93350 STRESS TTE ONLY: CPT | Mod: TC | Performed by: INTERNAL MEDICINE

## 2024-02-14 PROCEDURE — 93018 CV STRESS TEST I&R ONLY: CPT | Performed by: STUDENT IN AN ORGANIZED HEALTH CARE EDUCATION/TRAINING PROGRAM

## 2024-02-14 PROCEDURE — 93017 CV STRESS TEST TRACING ONLY: CPT | Performed by: INTERNAL MEDICINE

## 2024-02-14 PROCEDURE — 93352 ADMIN ECG CONTRAST AGENT: CPT | Performed by: INTERNAL MEDICINE

## 2024-02-14 PROCEDURE — 93016 CV STRESS TEST SUPVJ ONLY: CPT | Performed by: INTERNAL MEDICINE

## 2024-02-14 PROCEDURE — 93325 DOPPLER ECHO COLOR FLOW MAPG: CPT | Mod: 26 | Performed by: STUDENT IN AN ORGANIZED HEALTH CARE EDUCATION/TRAINING PROGRAM

## 2024-02-14 PROCEDURE — 93350 STRESS TTE ONLY: CPT | Mod: 26 | Performed by: STUDENT IN AN ORGANIZED HEALTH CARE EDUCATION/TRAINING PROGRAM

## 2024-02-14 PROCEDURE — 93321 DOPPLER ECHO F-UP/LMTD STD: CPT | Mod: 26 | Performed by: STUDENT IN AN ORGANIZED HEALTH CARE EDUCATION/TRAINING PROGRAM

## 2024-02-14 PROCEDURE — 93325 DOPPLER ECHO COLOR FLOW MAPG: CPT | Mod: TC | Performed by: INTERNAL MEDICINE

## 2024-02-14 PROCEDURE — 99207 PR STATISTIC IV PUSH SINGLE INITIAL SUBSTANCE: CPT | Performed by: INTERNAL MEDICINE

## 2024-02-14 PROCEDURE — 93321 DOPPLER ECHO F-UP/LMTD STD: CPT | Mod: TC | Performed by: INTERNAL MEDICINE

## 2024-02-14 RX ADMIN — Medication 3 ML: at 14:07

## 2024-12-29 ENCOUNTER — HEALTH MAINTENANCE LETTER (OUTPATIENT)
Age: 24
End: 2024-12-29

## 2025-02-26 DIAGNOSIS — J45.41 MODERATE PERSISTENT ASTHMA WITH ACUTE EXACERBATION: ICD-10-CM

## 2025-02-26 NOTE — TELEPHONE ENCOUNTER
Medication Question or Refill    Contacts       Contact Date/Time Type Contact Phone/Fax    02/26/2025 03:53 PM CST Phone (Incoming) Jonathan Rees (Self) [phone] (M)            What medication are you calling about (include dose and sig)?: montelukast (SINGULAIR) 10 MG tablet     Preferred Pharmacy:   [id number] [[place] [address]  Phone: [phone] Fax: [phone]        Controlled Substance Agreement on file:   CSA -- Patient Level:    CSA: None found at the patient level.       Who prescribed the medication?: pcp    Do you need a refill? Yes    When did you use the medication last? Today

## 2025-02-27 RX ORDER — MONTELUKAST SODIUM 10 MG/1
TABLET ORAL
Qty: 90 TABLET | Refills: 0 | Status: SHIPPED | OUTPATIENT
Start: 2025-02-27

## 2025-03-03 NOTE — TELEPHONE ENCOUNTER
Future Appointments 3/3/2025 - 8/30/2025        Date Visit Type Length Department Provider     6/23/2025 11:20 AM OFFICE VISIT 20 min [facility] Ana Cristina Morales MD    Location Instructions:     [facility] is located at [address] [place] [address] in [place], at the intersection of [facility]. This is one block south of the [facility]. Free parking is available in the lot directly north of the clinic across [facility]. The clinic is near stops along bus [address].

## 2025-06-18 ENCOUNTER — OFFICE VISIT (OUTPATIENT)
Dept: FAMILY MEDICINE | Facility: CLINIC | Age: 25
End: 2025-06-18

## 2025-06-18 VITALS
HEART RATE: 72 BPM | TEMPERATURE: 97.7 F | WEIGHT: 212 LBS | HEIGHT: 75 IN | SYSTOLIC BLOOD PRESSURE: 136 MMHG | BODY MASS INDEX: 26.36 KG/M2 | DIASTOLIC BLOOD PRESSURE: 70 MMHG | OXYGEN SATURATION: 95 % | RESPIRATION RATE: 16 BRPM

## 2025-06-18 DIAGNOSIS — Z23 ENCOUNTER FOR IMMUNIZATION: ICD-10-CM

## 2025-06-18 DIAGNOSIS — Z91.09 ENVIRONMENTAL ALLERGIES: ICD-10-CM

## 2025-06-18 DIAGNOSIS — Z11.59 NEED FOR HEPATITIS C SCREENING TEST: ICD-10-CM

## 2025-06-18 DIAGNOSIS — Z11.3 ROUTINE SCREENING FOR STI (SEXUALLY TRANSMITTED INFECTION): ICD-10-CM

## 2025-06-18 DIAGNOSIS — J45.41 MODERATE PERSISTENT ASTHMA WITH ACUTE EXACERBATION: ICD-10-CM

## 2025-06-18 DIAGNOSIS — J30.81 ALLERGIC RHINITIS DUE TO ANIMALS: ICD-10-CM

## 2025-06-18 DIAGNOSIS — Z11.4 SCREENING FOR HIV (HUMAN IMMUNODEFICIENCY VIRUS): ICD-10-CM

## 2025-06-18 DIAGNOSIS — J45.41 MODERATE PERSISTENT ASTHMA WITH EXACERBATION: Primary | ICD-10-CM

## 2025-06-18 PROBLEM — T78.40XA SEVERE ALLERGY: Status: ACTIVE | Noted: 2024-01-01

## 2025-06-18 LAB
ALBUMIN UR-MCNC: NEGATIVE MG/DL
APPEARANCE UR: CLEAR
BACTERIA #/AREA URNS HPF: ABNORMAL /HPF
BASOPHILS # BLD AUTO: 0 10E3/UL (ref 0–0.2)
BASOPHILS NFR BLD AUTO: 0 %
BILIRUB UR QL STRIP: NEGATIVE
COLOR UR AUTO: YELLOW
EOSINOPHIL # BLD AUTO: 0.2 10E3/UL (ref 0–0.7)
EOSINOPHIL NFR BLD AUTO: 4 %
ERYTHROCYTE [DISTWIDTH] IN BLOOD BY AUTOMATED COUNT: 13.1 % (ref 10–15)
GLUCOSE UR STRIP-MCNC: NEGATIVE MG/DL
HCT VFR BLD AUTO: 42.2 % (ref 40–53)
HGB BLD-MCNC: 14.1 G/DL (ref 13.3–17.7)
HGB UR QL STRIP: NEGATIVE
IMM GRANULOCYTES # BLD: 0 10E3/UL
IMM GRANULOCYTES NFR BLD: 0 %
KETONES UR STRIP-MCNC: NEGATIVE MG/DL
LEUKOCYTE ESTERASE UR QL STRIP: NEGATIVE
LYMPHOCYTES # BLD AUTO: 1.1 10E3/UL (ref 0.8–5.3)
LYMPHOCYTES NFR BLD AUTO: 18 %
MCH RBC QN AUTO: 29.4 PG (ref 26.5–33)
MCHC RBC AUTO-ENTMCNC: 33.4 G/DL (ref 31.5–36.5)
MCV RBC AUTO: 88 FL (ref 78–100)
MONOCYTES # BLD AUTO: 0.4 10E3/UL (ref 0–1.3)
MONOCYTES NFR BLD AUTO: 7 %
NEUTROPHILS # BLD AUTO: 4 10E3/UL (ref 1.6–8.3)
NEUTROPHILS NFR BLD AUTO: 71 %
NITRATE UR QL: NEGATIVE
PH UR STRIP: 6.5 [PH] (ref 5–8)
PLATELET # BLD AUTO: 164 10E3/UL (ref 150–450)
RBC # BLD AUTO: 4.79 10E6/UL (ref 4.4–5.9)
RBC #/AREA URNS AUTO: ABNORMAL /HPF
SP GR UR STRIP: 1.02 (ref 1–1.03)
SQUAMOUS #/AREA URNS AUTO: ABNORMAL /LPF
UROBILINOGEN UR STRIP-ACNC: 0.2 E.U./DL
WBC # BLD AUTO: 5.7 10E3/UL (ref 4–11)
WBC #/AREA URNS AUTO: ABNORMAL /HPF

## 2025-06-18 PROCEDURE — 86003 ALLG SPEC IGE CRUDE XTRC EA: CPT | Performed by: FAMILY MEDICINE

## 2025-06-18 PROCEDURE — 81001 URINALYSIS AUTO W/SCOPE: CPT | Performed by: FAMILY MEDICINE

## 2025-06-18 PROCEDURE — 86003 ALLG SPEC IGE CRUDE XTRC EA: CPT | Mod: 59 | Performed by: FAMILY MEDICINE

## 2025-06-18 PROCEDURE — 87491 CHLMYD TRACH DNA AMP PROBE: CPT | Performed by: FAMILY MEDICINE

## 2025-06-18 PROCEDURE — 87591 N.GONORRHOEAE DNA AMP PROB: CPT | Performed by: FAMILY MEDICINE

## 2025-06-18 PROCEDURE — 87389 HIV-1 AG W/HIV-1&-2 AB AG IA: CPT | Performed by: FAMILY MEDICINE

## 2025-06-18 PROCEDURE — 99207 ALLERGEN WHITE PINE IGE: CPT | Performed by: FAMILY MEDICINE

## 2025-06-18 PROCEDURE — 99214 OFFICE O/P EST MOD 30 MIN: CPT | Performed by: FAMILY MEDICINE

## 2025-06-18 PROCEDURE — 86803 HEPATITIS C AB TEST: CPT | Performed by: FAMILY MEDICINE

## 2025-06-18 PROCEDURE — 36415 COLL VENOUS BLD VENIPUNCTURE: CPT | Performed by: FAMILY MEDICINE

## 2025-06-18 PROCEDURE — 86780 TREPONEMA PALLIDUM: CPT | Performed by: FAMILY MEDICINE

## 2025-06-18 PROCEDURE — 85025 COMPLETE CBC W/AUTO DIFF WBC: CPT | Performed by: FAMILY MEDICINE

## 2025-06-18 PROCEDURE — 82785 ASSAY OF IGE: CPT | Performed by: FAMILY MEDICINE

## 2025-06-18 RX ORDER — OLOPATADINE HYDROCHLORIDE 1 MG/ML
1 SOLUTION OPHTHALMIC 2 TIMES DAILY
Qty: 5 ML | Refills: 5 | Status: SHIPPED | OUTPATIENT
Start: 2025-06-18

## 2025-06-18 RX ORDER — CETIRIZINE HYDROCHLORIDE 10 MG/1
10 TABLET ORAL DAILY
Qty: 90 TABLET | Refills: 4 | Status: SHIPPED | OUTPATIENT
Start: 2025-06-18

## 2025-06-18 RX ORDER — MONTELUKAST SODIUM 10 MG/1
TABLET ORAL
Qty: 90 TABLET | Refills: 4 | Status: SHIPPED | OUTPATIENT
Start: 2025-06-18

## 2025-06-18 ASSESSMENT — ASTHMA QUESTIONNAIRES
QUESTION_3 LAST FOUR WEEKS HOW OFTEN DID YOUR ASTHMA SYMPTOMS (WHEEZING, COUGHING, SHORTNESS OF BREATH, CHEST TIGHTNESS OR PAIN) WAKE YOU UP AT NIGHT OR EARLIER THAN USUAL IN THE MORNING: TWO OR THREE NIGHTS A WEEK
QUESTION_4 LAST FOUR WEEKS HOW OFTEN HAVE YOU USED YOUR RESCUE INHALER OR NEBULIZER MEDICATION (SUCH AS ALBUTEROL): ONE OR TWO TIMES PER DAY
QUESTION_1 LAST FOUR WEEKS HOW MUCH OF THE TIME DID YOUR ASTHMA KEEP YOU FROM GETTING AS MUCH DONE AT WORK, SCHOOL OR AT HOME: A LITTLE OF THE TIME
ACT_TOTALSCORE: 14
QUESTION_2 LAST FOUR WEEKS HOW OFTEN HAVE YOU HAD SHORTNESS OF BREATH: THREE TO SIX TIMES A WEEK
QUESTION_5 LAST FOUR WEEKS HOW WOULD YOU RATE YOUR ASTHMA CONTROL: SOMEWHAT CONTROLLED

## 2025-06-18 NOTE — PROGRESS NOTES
Assessment & Plan     Need for hepatitis C screening test  ***  - Hepatitis C Screen Reflex to HCV RNA Quant and Genotype    Screening for HIV (human immunodeficiency virus)  ***  - HIV Antigen Antibody Combo    Moderate persistent asthma with exacerbation  ***    Encounter for immunization  ***    Moderate persistent asthma with acute exacerbation  ***  - montelukast (SINGULAIR) 10 MG tablet  Dispense: 90 tablet; Refill: 4    Allergic rhinitis due to animals  ***  - cetirizine (ZYRTEC) 10 MG tablet  Dispense: 90 tablet; Refill: 4    Environmental allergies  ***  - Adult Allergy/Asthma  Referral  - olopatadine (PATANOL) 0.1 % ophthalmic solution  Dispense: 5 mL; Refill: 5  - CBC with platelets and differential  - Allergen cat epithellium IgE  - Allergen dog epithelium IgE  - Allergen Rocky grass IgE  - Allergen orchard grass IgE  - Allergen mal IgE  - Allergen D farinae IgE  - Allergen D pteronyssinus IgE  - Allergen alternaria alternata IgE  - Allergen aspergillus fumigatus IgE  - Allergen cladosporium herbarum IgE  - Allergen Epicoccum purpurascens IgE  - Allergen penicillium notatum IgE  - Allergen filemon white IgE  - Allergen Cedar IgE  - Allergen cottonwood IgE  - Allergen elm IgE  - Allergen maple box elder IgE  - Allergen oak white IgE  - Allergen Red Thayer IgE  - Allergen silver  birch IgE  - Allergen Tree White Thayer IgE  - Allergen Mather Tree  - Allergen white pine IgE  - Allergen English plantain IgE  - Allergen giant ragweed IgE  - Allergen lamb's quarter IgE  - Allergen Mugwort IgE  - Allergen ragweed short IgE  - Allergen Sagebrush Wormwood IgE  - Allergen Sheep Sorrel IgE  - Allergen thistle Russian IgE  - Allergen Weed Nettle IgE  - Allergen, Kochia/Firebush  - IgE    Routine screening for STI (sexually transmitted infection)  ***  - Chlamydia & Gonorrhea by PCR, [facility] - Clinic Collect  - Treponema Abs w Reflex to RPR and Titer  - UA with Microscopic reflex to Culture -  "Clinic Collect            BMI  Estimated body mass index is 26.5 kg/m  as calculated from the following:    Height as of this encounter: 1.905 m (6' 3\").    Weight as of this encounter: 96.2 kg (212 lb).   Weight management plan: Discussed healthy diet and exercise guidelines      Follow-up       Manas Castillo is a 25 year old, presenting for the following health issues:  Follow Up        6/18/2025    11:46 AM   Additional Questions   Roomed by hser   Accompanied by self     Bad allergies and ongoing symptoms despite many meds.    Singulair, advair, flonase 1 bid, zyrtec.      Asthma is better controlled than allergies.  Albuterol once daily mostly at night.  Advair daily in spring and fall when allergies are active.  Uses dad's advair - his costs $120/month     Allergies can ruin his day.      Eczema is pretty much gone - this was more of a reaction to belt.      Declines covid shot     Sti testing     Over the last 9 months- frequent urination but only small amounts.        History of Present Illness       He eats 2-3 servings of fruits and vegetables daily.He consumes 0 sweetened beverage(s) daily.He exercises with enough effort to increase his heart rate 60 or more minutes per day.  He exercises with enough effort to increase his heart rate 7 days per week.   He is taking medications regularly.            6/18/2025   Asthma   1.  In the past 4 weeks, how much of the time did your asthma keep you from getting as much done at work, school or at home? 4   2.  During the past 4 weeks, how often have you had shortness of breath? 3   3.  During the past 4 weeks, how often did your asthma symptoms (wheezing, coughing, shortness of breath, chest tightness or pain) wake you up at night or earlier than usual in the morning? 2   4.  During the past 4 weeks, how often have you used your rescue inhaler or nebulizer medication (such as albuterol)? 2   5.  How would you rate your asthma control during the past 4 weeks? 3 " "  ACT TOTAL SCORE (Goal Greater than or Equal to 20) 14    In the past 12 months, how many times did you visit the emergency room for your asthma without being admitted to the hospital? 0   In the past 12 months, how many times were you hospitalized overnight because of your asthma? 0   Do you have a cough, wheezing or shortness of breath? (!) COUGH    (!) TROUBLE WITH BREATHING (SHORTNESS OF BREATH)   What makes your asthma/breathing worse? Dust mites    Pollens   Do you want more information about how to use your inhaler? No       Patient-reported    Multiple values from one day are sorted in reverse-chronological order                       Objective    /70 (BP Location: Right arm, Patient Position: Sitting, Cuff Size: Adult Large)   Pulse 72   Temp 97.7  F (36.5  C) (Temporal)   Resp 16   Ht 1.905 m (6' 3\")   Wt 96.2 kg (212 lb)   SpO2 95%   BMI 26.50 kg/m    Body mass index is 26.5 kg/m .  Physical Exam   Complete 10 point ROS completed today as part of the exam and patient denies any symptoms as reviewed in HPI     Wt Readings from Last 3 Encounters:   06/18/25 96.2 kg (212 lb)   01/17/24 98.9 kg (218 lb)   12/08/23 97.5 kg (215 lb)       No LMP for male patient.    All normal as below except abnormalities include: ***  General is a  25 year old sitting comfortably in no apparent distress   HEENT:  TM are clear bilaterally.  Eye exams within normal   Neck: Supple without lymphadenopathy or thyromegally  CV: Regular rate and rhythm S1S2 without rubs, murmurs or gallops,   Lungs: Clear to auscultation bilaterally  Abd:  +BS, soft NT/ND,  No masses or organomegally,   Extremities: Warm, No Edema, 2+ Pedal and radial pulses bilaterally  Skin: No lesions or rashes noted  Neuro: Able to ambulate around the exam room with equal movement, strength and normal coordination of the upper and lower extremeties symmetrically  Pelvic:*** Deferred as pt is asymptomatic and not due for screening   Normal external " genitalia.  Healthy normal vaginal mucosa.  Health appearing cervix.  Testing obtained without pain or difficulty.      Breast: Normal breast exam.  No nipple changes, breast appear symmetric without nodules/lumps or skin changes. No lymphadenopathy noted.      Independent historian: ***    History summarized from1-2:***  Old Records-1: Outside allergies, meds, problems and immunizations were reconciled as needed from [facility]  ***  Radiology tests reviewed-1: ***  Lab tests reviewed-1: ***  Medicine tests reviewed-1: ***    Ana Cristina Morales MD             Signed Electronically by: Ana Cristina Morales MD  {Email feedback regarding this note to [email or web address]   :[id number]}

## 2025-06-18 NOTE — PATIENT INSTRUCTIONS
Encapsulate your mattress and pillow case    Wash hair at night    Sleep with windows closed     Zyrtec twice daily if you are having symptoms or zyrtec in morning and benadryl at night.

## 2025-06-18 NOTE — PROGRESS NOTES
Prior to immunization administration, verified patients identity using patient s name and date of birth. Please see Immunization Activity for additional information.     Screening Questionnaire for Adult Immunization    Are you sick today?   Yes   Do you have allergies to medications, food, a vaccine component or latex?   No   Have you ever had a serious reaction after receiving a vaccination?   No   Do you have a long-term health problem with heart, lung, kidney, or metabolic disease (e.g., diabetes), asthma, a blood disorder, no spleen, complement component deficiency, a cochlear implant, or a spinal fluid leak?  Are you on long-term aspirin therapy?   Yes   Do you have cancer, leukemia, HIV/AIDS, or any other immune system problem?   No   Do you have a parent, brother, or sister with an immune system problem?   No   In the past 3 months, have you taken medications that affect  your immune system, such as prednisone, other steroids, or anticancer drugs; drugs for the treatment of rheumatoid arthritis, Crohn s disease, or psoriasis; or have you had radiation treatments?   No   Have you had a seizure, or a brain or other nervous system problem?   No   During the past year, have you received a transfusion of blood or blood    products, or been given immune (gamma) globulin or antiviral drug?   No   For women: Are you pregnant or is there a chance you could become       pregnant during the next month?   No   Have you received any vaccinations in the past 4 weeks?   No     Immunization questionnaire was positive for at least one answer.  Notified provider.      Patient instructed to remain in clinic for 15 minutes afterwards, and to report any adverse reactions.     Screening performed by Reggie Pool MA on 6/18/2025 at 11:48 AM.       Answers submitted by the patient for this visit:  General Questionnaire (Submitted on 6/18/2025)  Chief Complaint: Chronic problems general questions HPI Form  How many servings of  fruits and vegetables do you eat daily?: 2-3  On average, how many sweetened beverages do you drink each day (Examples: soda, juice, sweet tea, etc.  Do NOT count diet or artificially sweetened beverages)?: 0  How many minutes a day do you exercise enough to make your heart beat faster?: 60 or more  How many days a week do you exercise enough to make your heart beat faster?: 7  How many days per week do you miss taking your medication?: 0  Questionnaire about: Chronic problems general questions HPI Form (Submitted on 6/18/2025)  Chief Complaint: Chronic problems general questions HPI Form

## 2025-06-19 ENCOUNTER — PATIENT OUTREACH (OUTPATIENT)
Dept: CARE COORDINATION | Facility: CLINIC | Age: 25
End: 2025-06-19

## 2025-06-19 ENCOUNTER — MYC MEDICAL ADVICE (OUTPATIENT)
Dept: PHARMACY | Facility: OTHER | Age: 25
End: 2025-06-19

## 2025-06-19 DIAGNOSIS — J45.41 MODERATE PERSISTENT ASTHMA WITH EXACERBATION: Primary | ICD-10-CM

## 2025-06-19 LAB
C TRACH DNA SPEC QL PROBE+SIG AMP: NEGATIVE
CALIF WALNUT POLN IGE QN: 5.09 KU(A)/L
EAST WHITE PINE IGE QN: 1.19 KU(A)/L
HCV AB SERPL QL IA: NONREACTIVE
HIV 1+2 AB+HIV1 P24 AG SERPL QL IA: NONREACTIVE
IGE SERPL-ACNC: 1644 KU/L (ref 0–114)
N GONORRHOEA DNA SPEC QL NAA+PROBE: NEGATIVE
NETTLE IGE QN: 10.4 KU(A)/L
SALTWORT IGE QN: 2.13 KU(A)/L
SHEEP SORREL IGE QN: 1.3 KU(A)/L
SILVER BIRCH IGE QN: 90.8 KU(A)/L
SPECIMEN TYPE: NORMAL
T PALLIDUM AB SER QL: NONREACTIVE
TIMOTHY IGE QN: 9.12 KU(A)/L
WHITE MULBERRY IGE QN: 0.91 KU(A)/L
WORMWOOD IGE QN: 2.92 KU(A)/L

## 2025-06-19 RX ORDER — BUDESONIDE AND FORMOTEROL FUMARATE DIHYDRATE 160; 4.5 UG/1; UG/1
AEROSOL RESPIRATORY (INHALATION)
Qty: 20.4 G | Refills: 11 | Status: SHIPPED | OUTPATIENT
Start: 2025-06-19

## 2025-06-20 LAB
A ALTERNATA IGE QN: 37.2 KU(A)/L
A FUMIGATUS IGE QN: 30.6 KU(A)/L
C HERBARUM IGE QN: 31.8 KU(A)/L
CAT DANDER IGG QN: 67 KU(A)/L
CEDAR IGE QN: 1.38 KU(A)/L
COCKSFOOT IGE QN: 6.62 KU(A)/L
COMMON RAGWEED IGE QN: 2.97 KU(A)/L
COTTONWOOD IGE QN: 5.52 KU(A)/L
D FARINAE IGE QN: 1.79 KU(A)/L
D PTERONYSS IGE QN: 2.03 KU(A)/L
DOG DANDER+EPITH IGE QN: 10.9 KU(A)/L
E PURPURASCENS IGE QN: >100 KU(A)/L
ENGL PLANTAIN IGE QN: 1.31 KU(A)/L
FIREBUSH IGE QN: 1.74 KU(A)/L
GIANT RAGWEED IGE QN: 2.83 KU(A)/L
GOOSEFOOT IGE QN: 5.72 KU(A)/L
JOHNSON GRASS IGE QN: 2.55 KU(A)/L
MAPLE IGE QN: 6.3 KU(A)/L
MUGWORT IGE QN: 3.25 KU(A)/L
P NOTATUM IGE QN: 8.12 KU(A)/L
RED MULBERRY IGE QN: 0.91 KU(A)/L
WHITE ASH IGE QN: 8.16 KU(A)/L
WHITE ELM IGE QN: 14.2 KU(A)/L
WHITE OAK IGE QN: 54.2 KU(A)/L

## 2025-06-23 ENCOUNTER — PATIENT OUTREACH (OUTPATIENT)
Dept: CARE COORDINATION | Facility: CLINIC | Age: 25
End: 2025-06-23

## 2025-06-26 ENCOUNTER — RESULTS FOLLOW-UP (OUTPATIENT)
Dept: FAMILY MEDICINE | Facility: CLINIC | Age: 25
End: 2025-06-26